# Patient Record
Sex: FEMALE | Race: WHITE | NOT HISPANIC OR LATINO | URBAN - METROPOLITAN AREA
[De-identification: names, ages, dates, MRNs, and addresses within clinical notes are randomized per-mention and may not be internally consistent; named-entity substitution may affect disease eponyms.]

---

## 2017-01-10 ENCOUNTER — COMMUNICATION - HEALTHEAST (OUTPATIENT)
Dept: FAMILY MEDICINE | Facility: CLINIC | Age: 82
End: 2017-01-10

## 2017-01-13 ENCOUNTER — COMMUNICATION - HEALTHEAST (OUTPATIENT)
Dept: FAMILY MEDICINE | Facility: CLINIC | Age: 82
End: 2017-01-13

## 2017-01-13 DIAGNOSIS — I48.0 PAROXYSMAL ATRIAL FIBRILLATION (H): ICD-10-CM

## 2017-02-09 ENCOUNTER — COMMUNICATION - HEALTHEAST (OUTPATIENT)
Dept: FAMILY MEDICINE | Facility: CLINIC | Age: 82
End: 2017-02-09

## 2017-02-22 ENCOUNTER — COMMUNICATION - HEALTHEAST (OUTPATIENT)
Dept: FAMILY MEDICINE | Facility: CLINIC | Age: 82
End: 2017-02-22

## 2017-02-22 DIAGNOSIS — K21.9 GERD (GASTROESOPHAGEAL REFLUX DISEASE): ICD-10-CM

## 2017-03-14 ENCOUNTER — COMMUNICATION - HEALTHEAST (OUTPATIENT)
Dept: FAMILY MEDICINE | Facility: CLINIC | Age: 82
End: 2017-03-14

## 2017-04-10 ENCOUNTER — COMMUNICATION - HEALTHEAST (OUTPATIENT)
Dept: FAMILY MEDICINE | Facility: CLINIC | Age: 82
End: 2017-04-10

## 2017-04-10 DIAGNOSIS — I48.0 PAROXYSMAL ATRIAL FIBRILLATION (H): ICD-10-CM

## 2017-04-14 ENCOUNTER — COMMUNICATION - HEALTHEAST (OUTPATIENT)
Dept: FAMILY MEDICINE | Facility: CLINIC | Age: 82
End: 2017-04-14

## 2017-04-14 DIAGNOSIS — F41.9 ANXIETY: ICD-10-CM

## 2017-05-08 ENCOUNTER — COMMUNICATION - HEALTHEAST (OUTPATIENT)
Dept: FAMILY MEDICINE | Facility: CLINIC | Age: 82
End: 2017-05-08

## 2017-05-11 ENCOUNTER — COMMUNICATION - HEALTHEAST (OUTPATIENT)
Dept: FAMILY MEDICINE | Facility: CLINIC | Age: 82
End: 2017-05-11

## 2017-05-23 ENCOUNTER — COMMUNICATION - HEALTHEAST (OUTPATIENT)
Dept: FAMILY MEDICINE | Facility: CLINIC | Age: 82
End: 2017-05-23

## 2017-05-23 DIAGNOSIS — R52 PAIN: ICD-10-CM

## 2017-06-23 ENCOUNTER — COMMUNICATION - HEALTHEAST (OUTPATIENT)
Dept: FAMILY MEDICINE | Facility: CLINIC | Age: 82
End: 2017-06-23

## 2017-06-23 DIAGNOSIS — R52 PAIN: ICD-10-CM

## 2017-07-04 ENCOUNTER — COMMUNICATION - HEALTHEAST (OUTPATIENT)
Dept: FAMILY MEDICINE | Facility: CLINIC | Age: 82
End: 2017-07-04

## 2017-07-04 DIAGNOSIS — I25.10 CAD (CORONARY ARTERY DISEASE): ICD-10-CM

## 2017-07-04 DIAGNOSIS — E78.5 HYPERLIPIDEMIA: ICD-10-CM

## 2017-07-05 ENCOUNTER — COMMUNICATION - HEALTHEAST (OUTPATIENT)
Dept: FAMILY MEDICINE | Facility: CLINIC | Age: 82
End: 2017-07-05

## 2017-07-10 ENCOUNTER — COMMUNICATION - HEALTHEAST (OUTPATIENT)
Dept: FAMILY MEDICINE | Facility: CLINIC | Age: 82
End: 2017-07-10

## 2017-07-10 DIAGNOSIS — I48.0 PAROXYSMAL ATRIAL FIBRILLATION (H): ICD-10-CM

## 2017-07-10 DIAGNOSIS — E78.5 DYSLIPIDEMIA: ICD-10-CM

## 2017-07-10 DIAGNOSIS — I25.10 CORONARY ATHEROSCLEROSIS: ICD-10-CM

## 2017-07-17 ENCOUNTER — COMMUNICATION - HEALTHEAST (OUTPATIENT)
Dept: FAMILY MEDICINE | Facility: CLINIC | Age: 82
End: 2017-07-17

## 2017-07-17 DIAGNOSIS — R52 PAIN: ICD-10-CM

## 2017-07-24 ENCOUNTER — OFFICE VISIT - HEALTHEAST (OUTPATIENT)
Dept: CARDIOLOGY | Facility: CLINIC | Age: 82
End: 2017-07-24

## 2017-07-24 DIAGNOSIS — E78.5 DYSLIPIDEMIA: ICD-10-CM

## 2017-07-24 DIAGNOSIS — I10 ESSENTIAL HYPERTENSION: ICD-10-CM

## 2017-07-24 DIAGNOSIS — I25.10 CORONARY ARTERY DISEASE INVOLVING NATIVE CORONARY ARTERY OF NATIVE HEART WITHOUT ANGINA PECTORIS: ICD-10-CM

## 2017-07-24 DIAGNOSIS — I48.0 PAROXYSMAL ATRIAL FIBRILLATION (H): ICD-10-CM

## 2017-07-24 LAB
ATRIAL RATE - MUSE: 54 BPM
DIASTOLIC BLOOD PRESSURE - MUSE: NORMAL MMHG
INTERPRETATION ECG - MUSE: NORMAL
P AXIS - MUSE: NORMAL DEGREES
PR INTERVAL - MUSE: 192 MS
QRS DURATION - MUSE: 80 MS
QT - MUSE: 434 MS
QTC - MUSE: 440 MS
R AXIS - MUSE: -15 DEGREES
SYSTOLIC BLOOD PRESSURE - MUSE: NORMAL MMHG
T AXIS - MUSE: 47 DEGREES
VENTRICULAR RATE- MUSE: 62 BPM

## 2017-07-24 ASSESSMENT — MIFFLIN-ST. JEOR: SCORE: 942.99

## 2017-07-27 ENCOUNTER — OFFICE VISIT - HEALTHEAST (OUTPATIENT)
Dept: FAMILY MEDICINE | Facility: CLINIC | Age: 82
End: 2017-07-27

## 2017-07-27 DIAGNOSIS — I10 ESSENTIAL HYPERTENSION: ICD-10-CM

## 2017-07-27 DIAGNOSIS — E78.5 HYPERLIPIDEMIA: ICD-10-CM

## 2017-07-27 DIAGNOSIS — I25.10 CORONARY ATHEROSCLEROSIS: ICD-10-CM

## 2017-07-27 DIAGNOSIS — R52 PAIN: ICD-10-CM

## 2017-07-27 DIAGNOSIS — M75.02 ADHESIVE CAPSULITIS OF LEFT SHOULDER: ICD-10-CM

## 2017-07-27 LAB
CHOLEST SERPL-MCNC: 155 MG/DL
FASTING STATUS PATIENT QL REPORTED: ABNORMAL
HDLC SERPL-MCNC: 36 MG/DL
LDLC SERPL CALC-MCNC: 80 MG/DL
TRIGL SERPL-MCNC: 195 MG/DL

## 2017-07-28 ENCOUNTER — COMMUNICATION - HEALTHEAST (OUTPATIENT)
Dept: FAMILY MEDICINE | Facility: CLINIC | Age: 82
End: 2017-07-28

## 2017-07-28 DIAGNOSIS — I25.10 CORONARY ATHEROSCLEROSIS: ICD-10-CM

## 2017-08-02 ENCOUNTER — OFFICE VISIT - HEALTHEAST (OUTPATIENT)
Dept: PHYSICAL THERAPY | Facility: REHABILITATION | Age: 82
End: 2017-08-02

## 2017-08-02 DIAGNOSIS — M25.512 PAIN IN JOINT OF LEFT SHOULDER: ICD-10-CM

## 2017-08-02 DIAGNOSIS — M25.532 PAIN IN JOINT, FOREARM, LEFT: ICD-10-CM

## 2017-08-02 DIAGNOSIS — M75.02 ADHESIVE CAPSULITIS OF LEFT SHOULDER: ICD-10-CM

## 2017-08-02 DIAGNOSIS — M25.542 ARTHRALGIA OF LEFT HAND: ICD-10-CM

## 2017-08-02 DIAGNOSIS — M25.522 PAIN IN JOINT, UPPER ARM, LEFT: ICD-10-CM

## 2017-08-09 ENCOUNTER — COMMUNICATION - HEALTHEAST (OUTPATIENT)
Dept: FAMILY MEDICINE | Facility: CLINIC | Age: 82
End: 2017-08-09

## 2017-08-09 DIAGNOSIS — I25.10 CORONARY ATHEROSCLEROSIS: ICD-10-CM

## 2017-08-15 ENCOUNTER — OFFICE VISIT - HEALTHEAST (OUTPATIENT)
Dept: PHYSICAL THERAPY | Facility: REHABILITATION | Age: 82
End: 2017-08-15

## 2017-08-15 DIAGNOSIS — M25.512 PAIN IN JOINT OF LEFT SHOULDER: ICD-10-CM

## 2017-08-15 DIAGNOSIS — M25.522 PAIN IN JOINT, UPPER ARM, LEFT: ICD-10-CM

## 2017-08-15 DIAGNOSIS — M25.532 PAIN IN JOINT, FOREARM, LEFT: ICD-10-CM

## 2017-08-15 DIAGNOSIS — M62.81 MUSCLE WEAKNESS (GENERALIZED): ICD-10-CM

## 2017-08-15 DIAGNOSIS — M25.60 LIMITED JOINT RANGE OF MOTION (ROM): ICD-10-CM

## 2017-08-15 DIAGNOSIS — M75.02 ADHESIVE CAPSULITIS OF LEFT SHOULDER: ICD-10-CM

## 2017-08-15 DIAGNOSIS — M25.542 ARTHRALGIA OF LEFT HAND: ICD-10-CM

## 2017-08-23 ENCOUNTER — OFFICE VISIT - HEALTHEAST (OUTPATIENT)
Dept: PHYSICAL THERAPY | Facility: REHABILITATION | Age: 82
End: 2017-08-23

## 2017-08-23 DIAGNOSIS — M25.512 PAIN IN JOINT OF LEFT SHOULDER: ICD-10-CM

## 2017-08-23 DIAGNOSIS — M25.522 PAIN IN JOINT, UPPER ARM, LEFT: ICD-10-CM

## 2017-08-23 DIAGNOSIS — M25.542 ARTHRALGIA OF LEFT HAND: ICD-10-CM

## 2017-08-23 DIAGNOSIS — M75.02 ADHESIVE CAPSULITIS OF LEFT SHOULDER: ICD-10-CM

## 2017-08-23 DIAGNOSIS — M25.532 PAIN IN JOINT, FOREARM, LEFT: ICD-10-CM

## 2017-08-25 ENCOUNTER — OFFICE VISIT - HEALTHEAST (OUTPATIENT)
Dept: PHYSICAL THERAPY | Facility: REHABILITATION | Age: 82
End: 2017-08-25

## 2017-08-25 DIAGNOSIS — M25.532 PAIN IN JOINT, FOREARM, LEFT: ICD-10-CM

## 2017-08-25 DIAGNOSIS — M25.522 PAIN IN JOINT, UPPER ARM, LEFT: ICD-10-CM

## 2017-08-25 DIAGNOSIS — M75.02 ADHESIVE CAPSULITIS OF LEFT SHOULDER: ICD-10-CM

## 2017-08-25 DIAGNOSIS — M25.512 PAIN IN JOINT OF LEFT SHOULDER: ICD-10-CM

## 2017-08-25 DIAGNOSIS — M25.542 ARTHRALGIA OF LEFT HAND: ICD-10-CM

## 2017-08-27 ENCOUNTER — COMMUNICATION - HEALTHEAST (OUTPATIENT)
Dept: FAMILY MEDICINE | Facility: CLINIC | Age: 82
End: 2017-08-27

## 2017-08-27 DIAGNOSIS — I25.10 CAD (CORONARY ARTERY DISEASE): ICD-10-CM

## 2017-08-28 ENCOUNTER — OFFICE VISIT - HEALTHEAST (OUTPATIENT)
Dept: PHYSICAL THERAPY | Facility: REHABILITATION | Age: 82
End: 2017-08-28

## 2017-08-28 DIAGNOSIS — M25.542 ARTHRALGIA OF LEFT HAND: ICD-10-CM

## 2017-08-28 DIAGNOSIS — M25.512 PAIN IN JOINT OF LEFT SHOULDER: ICD-10-CM

## 2017-08-28 DIAGNOSIS — M75.02 ADHESIVE CAPSULITIS OF LEFT SHOULDER: ICD-10-CM

## 2017-08-28 DIAGNOSIS — M25.532 PAIN IN JOINT, FOREARM, LEFT: ICD-10-CM

## 2017-08-28 DIAGNOSIS — M25.522 PAIN IN JOINT, UPPER ARM, LEFT: ICD-10-CM

## 2017-08-30 ENCOUNTER — OFFICE VISIT - HEALTHEAST (OUTPATIENT)
Dept: PHYSICAL THERAPY | Facility: REHABILITATION | Age: 82
End: 2017-08-30

## 2017-08-30 DIAGNOSIS — M25.512 PAIN IN JOINT OF LEFT SHOULDER: ICD-10-CM

## 2017-08-30 DIAGNOSIS — M75.02 ADHESIVE CAPSULITIS OF LEFT SHOULDER: ICD-10-CM

## 2017-08-30 DIAGNOSIS — M25.522 PAIN IN JOINT, UPPER ARM, LEFT: ICD-10-CM

## 2017-08-30 DIAGNOSIS — M25.542 ARTHRALGIA OF LEFT HAND: ICD-10-CM

## 2017-08-30 DIAGNOSIS — M25.532 PAIN IN JOINT, FOREARM, LEFT: ICD-10-CM

## 2017-09-26 ENCOUNTER — COMMUNICATION - HEALTHEAST (OUTPATIENT)
Dept: FAMILY MEDICINE | Facility: CLINIC | Age: 82
End: 2017-09-26

## 2017-09-26 DIAGNOSIS — M75.02 ADHESIVE CAPSULITIS OF LEFT SHOULDER: ICD-10-CM

## 2017-10-14 ENCOUNTER — COMMUNICATION - HEALTHEAST (OUTPATIENT)
Dept: FAMILY MEDICINE | Facility: CLINIC | Age: 82
End: 2017-10-14

## 2017-11-04 ENCOUNTER — COMMUNICATION - HEALTHEAST (OUTPATIENT)
Dept: FAMILY MEDICINE | Facility: CLINIC | Age: 82
End: 2017-11-04

## 2017-11-15 ENCOUNTER — COMMUNICATION - HEALTHEAST (OUTPATIENT)
Dept: FAMILY MEDICINE | Facility: CLINIC | Age: 82
End: 2017-11-15

## 2017-11-15 DIAGNOSIS — M75.02 ADHESIVE CAPSULITIS OF LEFT SHOULDER: ICD-10-CM

## 2017-11-20 ENCOUNTER — OFFICE VISIT - HEALTHEAST (OUTPATIENT)
Dept: FAMILY MEDICINE | Facility: CLINIC | Age: 82
End: 2017-11-20

## 2017-11-20 DIAGNOSIS — Z13.820 SCREENING FOR OSTEOPOROSIS: ICD-10-CM

## 2017-11-20 DIAGNOSIS — F33.0 MILD EPISODE OF RECURRENT MAJOR DEPRESSIVE DISORDER (H): ICD-10-CM

## 2017-11-20 DIAGNOSIS — R06.02 SOB (SHORTNESS OF BREATH): ICD-10-CM

## 2017-11-20 DIAGNOSIS — I25.10 CORONARY ATHEROSCLEROSIS: ICD-10-CM

## 2017-11-20 DIAGNOSIS — M75.02 ADHESIVE CAPSULITIS OF LEFT SHOULDER: ICD-10-CM

## 2017-11-20 DIAGNOSIS — R07.9 CHEST PAIN, UNSPECIFIED TYPE: ICD-10-CM

## 2017-11-20 DIAGNOSIS — I25.10 CAD (CORONARY ARTERY DISEASE): ICD-10-CM

## 2017-11-24 ENCOUNTER — COMMUNICATION - HEALTHEAST (OUTPATIENT)
Dept: FAMILY MEDICINE | Facility: CLINIC | Age: 82
End: 2017-11-24

## 2017-11-24 DIAGNOSIS — K21.9 GERD (GASTROESOPHAGEAL REFLUX DISEASE): ICD-10-CM

## 2017-11-28 ENCOUNTER — HOSPITAL ENCOUNTER (OUTPATIENT)
Dept: CARDIOLOGY | Facility: CLINIC | Age: 82
Discharge: HOME | End: 2017-11-28
Attending: FAMILY MEDICINE

## 2017-11-28 DIAGNOSIS — I25.10 CAD (CORONARY ARTERY DISEASE): ICD-10-CM

## 2017-11-28 DIAGNOSIS — R07.9 CHEST PAIN, UNSPECIFIED TYPE: ICD-10-CM

## 2017-11-28 DIAGNOSIS — R06.02 SOB (SHORTNESS OF BREATH): ICD-10-CM

## 2017-11-28 DIAGNOSIS — I25.10 CORONARY ATHEROSCLEROSIS: ICD-10-CM

## 2017-11-28 LAB
AORTIC ROOT: 3 CM
BSA FOR ECHO PROCEDURE: 1.59 M2
CV BLOOD PRESSURE: NORMAL MMHG
CV ECHO HEIGHT: 59 IN
CV ECHO WEIGHT: 135 LBS
DOP CALC LVOT AREA: 2.54 CM2
DOP CALC LVOT DIAMETER: 1.8 CM
DOP CALC LVOT PEAK VEL: 84.6 CM/S
DOP CALC LVOT STROKE VOLUME: 36.6 CM3
DOP CALC MV VTI: 27.4 CM
DOP CALCLVOT PEAK VEL VTI: 14.4 CM
ECHO EJECTION FRACTION ESTIMATED: 55 %
EJECTION FRACTION: 49 % (ref 55–75)
FRACTIONAL SHORTENING: 25.1 % (ref 28–44)
INTERVENTRICULAR SEPTUM IN END DIASTOLE: 1.25 CM (ref 0.6–0.9)
IVS/PW RATIO: 1
LA AREA 2: 13.8 CM2
LEFT ATRIUM LENGTH: 5.28 CM
LEFT ATRIUM SIZE: 3.8 CM
LEFT VENTRICLE CARDIAC INDEX: 1.2 L/MIN/M2
LEFT VENTRICLE CARDIAC OUTPUT: 1.9 L/MIN
LEFT VENTRICLE DIASTOLIC VOLUME INDEX: 24.5 CM3/M2 (ref 34–74)
LEFT VENTRICLE DIASTOLIC VOLUME: 39 CM3 (ref 46–106)
LEFT VENTRICLE HEART RATE: 53 BPM
LEFT VENTRICLE MASS INDEX: 98.3 G/M2
LEFT VENTRICLE SYSTOLIC VOLUME INDEX: 12.6 CM3/M2 (ref 11–31)
LEFT VENTRICLE SYSTOLIC VOLUME: 20 CM3 (ref 14–42)
LEFT VENTRICULAR INTERNAL DIMENSION IN DIASTOLE: 3.63 CM (ref 3.8–5.2)
LEFT VENTRICULAR INTERNAL DIMENSION IN SYSTOLE: 2.72 CM (ref 2.2–3.5)
LEFT VENTRICULAR MASS: 156.2 G
LEFT VENTRICULAR OUTFLOW TRACT MEAN GRADIENT: 1 MMHG
LEFT VENTRICULAR OUTFLOW TRACT MEAN VELOCITY: 53 CM/S
LEFT VENTRICULAR OUTFLOW TRACT PEAK GRADIENT: 3 MMHG
LEFT VENTRICULAR POSTERIOR WALL IN END DIASTOLE: 1.29 CM (ref 0.6–0.9)
LV STROKE VOLUME INDEX: 23 ML/M2
MITRAL VALVE DECELERATION SLOPE: 1120 MM/S2
MITRAL VALVE E/A RATIO: 0.6
MITRAL VALVE MEAN INFLOW VELOCITY: 43.6 CM/S
MITRAL VALVE PEAK VELOCITY: 85.7 CM/S
MITRAL VALVE PRESSURE HALF-TIME: 147 MS
MV AREA VTI: 1.34 CM2
MV AVERAGE E/E' RATIO: 8.1 CM/S
MV DECELERATION TIME: 254 MS
MV E'TISSUE VEL-LAT: 5.85 CM/S
MV E'TISSUE VEL-MED: 3.31 CM/S
MV LATERAL E/E' RATIO: 6.3
MV MEAN GRADIENT: 1 MMHG
MV MEDIAL E/E' RATIO: 11.2
MV PEAK A VELOCITY: 61.1 CM/S
MV PEAK E VELOCITY: 37 CM/S
MV PEAK GRADIENT: 2.9 MMHG
MV VALVE AREA BY CONTINUITY EQUATION: 1.3 CM2
MV VALVE AREA PRESSURE 1/2 METHOD: 1.5 CM2
NUC REST DIASTOLIC VOLUME INDEX: 2160 LBS
NUC REST SYSTOLIC VOLUME INDEX: 59 IN
PR MAX PG: 5 MMHG
PR PEAK VELOCITY: 108 CM/S
TRICUSPID REGURGITATION PEAK PRESSURE GRADIENT: 18.7 MMHG
TRICUSPID VALVE PEAK REGURGITANT VELOCITY: 216 CM/S

## 2017-11-28 ASSESSMENT — MIFFLIN-ST. JEOR: SCORE: 942.99

## 2017-12-05 ENCOUNTER — COMMUNICATION - HEALTHEAST (OUTPATIENT)
Dept: FAMILY MEDICINE | Facility: CLINIC | Age: 82
End: 2017-12-05

## 2017-12-05 DIAGNOSIS — K21.9 GERD (GASTROESOPHAGEAL REFLUX DISEASE): ICD-10-CM

## 2018-01-31 ENCOUNTER — COMMUNICATION - HEALTHEAST (OUTPATIENT)
Dept: FAMILY MEDICINE | Facility: CLINIC | Age: 83
End: 2018-01-31

## 2018-01-31 DIAGNOSIS — M75.02 ADHESIVE CAPSULITIS OF LEFT SHOULDER: ICD-10-CM

## 2018-02-14 ENCOUNTER — COMMUNICATION - HEALTHEAST (OUTPATIENT)
Dept: FAMILY MEDICINE | Facility: CLINIC | Age: 83
End: 2018-02-14

## 2018-02-15 ENCOUNTER — OFFICE VISIT - HEALTHEAST (OUTPATIENT)
Dept: FAMILY MEDICINE | Facility: CLINIC | Age: 83
End: 2018-02-15

## 2018-02-15 DIAGNOSIS — I10 ESSENTIAL HYPERTENSION: ICD-10-CM

## 2018-02-15 DIAGNOSIS — F51.01 PRIMARY INSOMNIA: ICD-10-CM

## 2018-02-22 ENCOUNTER — OFFICE VISIT - HEALTHEAST (OUTPATIENT)
Dept: FAMILY MEDICINE | Facility: CLINIC | Age: 83
End: 2018-02-22

## 2018-02-22 DIAGNOSIS — Z13.820 SCREENING FOR OSTEOPOROSIS: ICD-10-CM

## 2018-02-22 DIAGNOSIS — F51.01 PRIMARY INSOMNIA: ICD-10-CM

## 2018-02-22 DIAGNOSIS — F33.0 MILD EPISODE OF RECURRENT MAJOR DEPRESSIVE DISORDER (H): ICD-10-CM

## 2018-02-22 DIAGNOSIS — R41.3 MEMORY DEFICIT: ICD-10-CM

## 2018-02-22 DIAGNOSIS — I10 ESSENTIAL HYPERTENSION: ICD-10-CM

## 2018-03-07 ENCOUNTER — RECORDS - HEALTHEAST (OUTPATIENT)
Dept: ADMINISTRATIVE | Facility: OTHER | Age: 83
End: 2018-03-07

## 2018-03-07 ENCOUNTER — COMMUNICATION - HEALTHEAST (OUTPATIENT)
Dept: FAMILY MEDICINE | Facility: CLINIC | Age: 83
End: 2018-03-07

## 2018-03-07 ENCOUNTER — RECORDS - HEALTHEAST (OUTPATIENT)
Dept: BONE DENSITY | Facility: CLINIC | Age: 83
End: 2018-03-07

## 2018-03-07 DIAGNOSIS — Z78.0 ASYMPTOMATIC MENOPAUSAL STATE: ICD-10-CM

## 2018-03-07 DIAGNOSIS — Z13.820 ENCOUNTER FOR SCREENING FOR OSTEOPOROSIS: ICD-10-CM

## 2018-03-12 ENCOUNTER — OFFICE VISIT - HEALTHEAST (OUTPATIENT)
Dept: FAMILY MEDICINE | Facility: CLINIC | Age: 83
End: 2018-03-12

## 2018-03-12 DIAGNOSIS — F51.01 PRIMARY INSOMNIA: ICD-10-CM

## 2018-03-12 DIAGNOSIS — M81.0 AGE-RELATED OSTEOPOROSIS WITHOUT CURRENT PATHOLOGICAL FRACTURE: ICD-10-CM

## 2018-03-12 DIAGNOSIS — I10 ESSENTIAL HYPERTENSION: ICD-10-CM

## 2018-03-13 ENCOUNTER — COMMUNICATION - HEALTHEAST (OUTPATIENT)
Dept: FAMILY MEDICINE | Facility: CLINIC | Age: 83
End: 2018-03-13

## 2018-03-14 ENCOUNTER — AMBULATORY - HEALTHEAST (OUTPATIENT)
Dept: NURSING | Facility: CLINIC | Age: 83
End: 2018-03-14

## 2018-03-14 DIAGNOSIS — I10 ESSENTIAL HYPERTENSION: ICD-10-CM

## 2018-03-16 ENCOUNTER — OFFICE VISIT - HEALTHEAST (OUTPATIENT)
Dept: FAMILY MEDICINE | Facility: CLINIC | Age: 83
End: 2018-03-16

## 2018-03-16 DIAGNOSIS — R41.0 DELIRIUM: ICD-10-CM

## 2018-03-16 DIAGNOSIS — F03.90 DEMENTIA (H): ICD-10-CM

## 2018-03-20 ENCOUNTER — COMMUNICATION - HEALTHEAST (OUTPATIENT)
Dept: FAMILY MEDICINE | Facility: CLINIC | Age: 83
End: 2018-03-20

## 2018-03-20 DIAGNOSIS — F41.9 ANXIETY: ICD-10-CM

## 2018-03-21 ENCOUNTER — COMMUNICATION - HEALTHEAST (OUTPATIENT)
Dept: FAMILY MEDICINE | Facility: CLINIC | Age: 83
End: 2018-03-21

## 2018-03-21 ENCOUNTER — AMBULATORY - HEALTHEAST (OUTPATIENT)
Dept: FAMILY MEDICINE | Facility: CLINIC | Age: 83
End: 2018-03-21

## 2018-03-23 ENCOUNTER — OFFICE VISIT - HEALTHEAST (OUTPATIENT)
Dept: FAMILY MEDICINE | Facility: CLINIC | Age: 83
End: 2018-03-23

## 2018-03-23 DIAGNOSIS — I10 ESSENTIAL HYPERTENSION: ICD-10-CM

## 2018-03-23 DIAGNOSIS — F51.01 PRIMARY INSOMNIA: ICD-10-CM

## 2018-03-23 DIAGNOSIS — G40.209 PARTIAL SYMPTOMATIC EPILEPSY WITH COMPLEX PARTIAL SEIZURES, NOT INTRACTABLE, WITHOUT STATUS EPILEPTICUS (H): ICD-10-CM

## 2018-03-23 DIAGNOSIS — R41.3 MEMORY DEFICIT: ICD-10-CM

## 2018-03-23 DIAGNOSIS — I63.9 SUBCORTICAL INFARCTION (H): ICD-10-CM

## 2018-04-05 ENCOUNTER — AMBULATORY - HEALTHEAST (OUTPATIENT)
Dept: BEHAVIORAL HEALTH | Facility: CLINIC | Age: 83
End: 2018-04-05

## 2018-04-05 DIAGNOSIS — I63.9 SUBCORTICAL INFARCTION (H): ICD-10-CM

## 2018-04-05 DIAGNOSIS — F01.A0 MAJOR NEUROCOGNITIVE DISORDER, DUE TO VASCULAR DISEASE, WITHOUT BEHAVIORAL DISTURBANCE, MILD (H): ICD-10-CM

## 2018-04-11 ENCOUNTER — COMMUNICATION - HEALTHEAST (OUTPATIENT)
Dept: FAMILY MEDICINE | Facility: CLINIC | Age: 83
End: 2018-04-11

## 2018-04-11 DIAGNOSIS — I48.0 PAROXYSMAL ATRIAL FIBRILLATION (H): ICD-10-CM

## 2018-07-17 ENCOUNTER — COMMUNICATION - HEALTHEAST (OUTPATIENT)
Dept: FAMILY MEDICINE | Facility: CLINIC | Age: 83
End: 2018-07-17

## 2018-07-17 DIAGNOSIS — G40.209 PARTIAL SYMPTOMATIC EPILEPSY WITH COMPLEX PARTIAL SEIZURES, NOT INTRACTABLE, WITHOUT STATUS EPILEPTICUS (H): ICD-10-CM

## 2018-08-28 ENCOUNTER — COMMUNICATION - HEALTHEAST (OUTPATIENT)
Dept: ADMINISTRATIVE | Facility: CLINIC | Age: 83
End: 2018-08-28

## 2018-08-28 ENCOUNTER — COMMUNICATION - HEALTHEAST (OUTPATIENT)
Dept: FAMILY MEDICINE | Facility: CLINIC | Age: 83
End: 2018-08-28

## 2018-08-28 DIAGNOSIS — I25.10 CORONARY ATHEROSCLEROSIS: ICD-10-CM

## 2018-09-14 ENCOUNTER — COMMUNICATION - HEALTHEAST (OUTPATIENT)
Dept: FAMILY MEDICINE | Facility: CLINIC | Age: 83
End: 2018-09-14

## 2018-09-18 ENCOUNTER — COMMUNICATION - HEALTHEAST (OUTPATIENT)
Dept: CARE COORDINATION | Facility: CLINIC | Age: 83
End: 2018-09-18

## 2018-10-04 ENCOUNTER — COMMUNICATION - HEALTHEAST (OUTPATIENT)
Dept: FAMILY MEDICINE | Facility: CLINIC | Age: 83
End: 2018-10-04

## 2018-10-04 DIAGNOSIS — E78.5 HYPERLIPIDEMIA: ICD-10-CM

## 2018-11-02 ENCOUNTER — OFFICE VISIT - HEALTHEAST (OUTPATIENT)
Dept: CARDIOLOGY | Facility: CLINIC | Age: 83
End: 2018-11-02

## 2018-11-02 DIAGNOSIS — I25.10 CORONARY ARTERY DISEASE INVOLVING NATIVE CORONARY ARTERY OF NATIVE HEART WITHOUT ANGINA PECTORIS: ICD-10-CM

## 2018-11-02 DIAGNOSIS — I10 ESSENTIAL HYPERTENSION: ICD-10-CM

## 2018-11-02 DIAGNOSIS — E78.5 DYSLIPIDEMIA: ICD-10-CM

## 2018-11-02 DIAGNOSIS — I48.0 PAROXYSMAL ATRIAL FIBRILLATION (H): ICD-10-CM

## 2018-11-02 ASSESSMENT — MIFFLIN-ST. JEOR: SCORE: 974.74

## 2018-11-16 ENCOUNTER — COMMUNICATION - HEALTHEAST (OUTPATIENT)
Dept: FAMILY MEDICINE | Facility: CLINIC | Age: 83
End: 2018-11-16

## 2018-11-16 DIAGNOSIS — M75.02 ADHESIVE CAPSULITIS OF LEFT SHOULDER: ICD-10-CM

## 2018-11-20 ENCOUNTER — COMMUNICATION - HEALTHEAST (OUTPATIENT)
Dept: FAMILY MEDICINE | Facility: CLINIC | Age: 83
End: 2018-11-20

## 2018-11-21 ENCOUNTER — COMMUNICATION - HEALTHEAST (OUTPATIENT)
Dept: FAMILY MEDICINE | Facility: CLINIC | Age: 83
End: 2018-11-21

## 2018-11-21 DIAGNOSIS — M75.02 ADHESIVE CAPSULITIS OF LEFT SHOULDER: ICD-10-CM

## 2018-11-22 ENCOUNTER — COMMUNICATION - HEALTHEAST (OUTPATIENT)
Dept: FAMILY MEDICINE | Facility: CLINIC | Age: 83
End: 2018-11-22

## 2018-11-22 DIAGNOSIS — I25.10 CAD (CORONARY ARTERY DISEASE): ICD-10-CM

## 2018-12-04 ENCOUNTER — COMMUNICATION - HEALTHEAST (OUTPATIENT)
Dept: FAMILY MEDICINE | Facility: CLINIC | Age: 83
End: 2018-12-04

## 2018-12-04 DIAGNOSIS — G40.209 PARTIAL SYMPTOMATIC EPILEPSY WITH COMPLEX PARTIAL SEIZURES, NOT INTRACTABLE, WITHOUT STATUS EPILEPTICUS (H): ICD-10-CM

## 2018-12-31 ENCOUNTER — COMMUNICATION - HEALTHEAST (OUTPATIENT)
Dept: FAMILY MEDICINE | Facility: CLINIC | Age: 83
End: 2018-12-31

## 2018-12-31 DIAGNOSIS — I63.9 SUBCORTICAL INFARCTION (H): ICD-10-CM

## 2019-01-08 ENCOUNTER — COMMUNICATION - HEALTHEAST (OUTPATIENT)
Dept: FAMILY MEDICINE | Facility: CLINIC | Age: 84
End: 2019-01-08

## 2019-01-08 DIAGNOSIS — M75.02 ADHESIVE CAPSULITIS OF LEFT SHOULDER: ICD-10-CM

## 2019-01-15 ENCOUNTER — OFFICE VISIT - HEALTHEAST (OUTPATIENT)
Dept: FAMILY MEDICINE | Facility: CLINIC | Age: 84
End: 2019-01-15

## 2019-01-15 DIAGNOSIS — I25.10 CORONARY ARTERY DISEASE INVOLVING NATIVE CORONARY ARTERY OF NATIVE HEART WITHOUT ANGINA PECTORIS: ICD-10-CM

## 2019-01-15 DIAGNOSIS — I63.9 SUBCORTICAL INFARCTION (H): ICD-10-CM

## 2019-01-15 DIAGNOSIS — I48.0 PAROXYSMAL ATRIAL FIBRILLATION (H): ICD-10-CM

## 2019-01-15 DIAGNOSIS — F33.0 MILD EPISODE OF RECURRENT MAJOR DEPRESSIVE DISORDER (H): ICD-10-CM

## 2019-01-15 ASSESSMENT — MIFFLIN-ST. JEOR: SCORE: 965.67

## 2019-01-24 ENCOUNTER — AMBULATORY - HEALTHEAST (OUTPATIENT)
Dept: FAMILY MEDICINE | Facility: CLINIC | Age: 84
End: 2019-01-24

## 2019-01-25 ENCOUNTER — COMMUNICATION - HEALTHEAST (OUTPATIENT)
Dept: FAMILY MEDICINE | Facility: CLINIC | Age: 84
End: 2019-01-25

## 2019-02-07 ENCOUNTER — COMMUNICATION - HEALTHEAST (OUTPATIENT)
Dept: FAMILY MEDICINE | Facility: CLINIC | Age: 84
End: 2019-02-07

## 2019-02-07 DIAGNOSIS — M75.02 ADHESIVE CAPSULITIS OF LEFT SHOULDER: ICD-10-CM

## 2019-02-09 ENCOUNTER — COMMUNICATION - HEALTHEAST (OUTPATIENT)
Dept: FAMILY MEDICINE | Facility: CLINIC | Age: 84
End: 2019-02-09

## 2019-02-09 DIAGNOSIS — F33.0 MILD EPISODE OF RECURRENT MAJOR DEPRESSIVE DISORDER (H): ICD-10-CM

## 2019-02-18 ENCOUNTER — COMMUNICATION - HEALTHEAST (OUTPATIENT)
Dept: FAMILY MEDICINE | Facility: CLINIC | Age: 84
End: 2019-02-18

## 2019-02-18 DIAGNOSIS — K21.9 GERD (GASTROESOPHAGEAL REFLUX DISEASE): ICD-10-CM

## 2019-03-04 ENCOUNTER — COMMUNICATION - HEALTHEAST (OUTPATIENT)
Dept: FAMILY MEDICINE | Facility: CLINIC | Age: 84
End: 2019-03-04

## 2019-03-04 DIAGNOSIS — G40.209 PARTIAL SYMPTOMATIC EPILEPSY WITH COMPLEX PARTIAL SEIZURES, NOT INTRACTABLE, WITHOUT STATUS EPILEPTICUS (H): ICD-10-CM

## 2019-03-26 ENCOUNTER — COMMUNICATION - HEALTHEAST (OUTPATIENT)
Dept: PHARMACY | Facility: CLINIC | Age: 84
End: 2019-03-26

## 2019-03-27 ENCOUNTER — COMMUNICATION - HEALTHEAST (OUTPATIENT)
Dept: FAMILY MEDICINE | Facility: CLINIC | Age: 84
End: 2019-03-27

## 2019-04-02 ENCOUNTER — COMMUNICATION - HEALTHEAST (OUTPATIENT)
Dept: FAMILY MEDICINE | Facility: CLINIC | Age: 84
End: 2019-04-02

## 2019-04-02 DIAGNOSIS — I48.0 PAROXYSMAL ATRIAL FIBRILLATION (H): ICD-10-CM

## 2019-04-16 ENCOUNTER — COMMUNICATION - HEALTHEAST (OUTPATIENT)
Dept: FAMILY MEDICINE | Facility: CLINIC | Age: 84
End: 2019-04-16

## 2019-04-16 DIAGNOSIS — E78.5 HYPERLIPIDEMIA: ICD-10-CM

## 2019-04-17 ENCOUNTER — COMMUNICATION - HEALTHEAST (OUTPATIENT)
Dept: FAMILY MEDICINE | Facility: CLINIC | Age: 84
End: 2019-04-17

## 2019-05-02 ENCOUNTER — AMBULATORY - HEALTHEAST (OUTPATIENT)
Dept: PHARMACY | Facility: CLINIC | Age: 84
End: 2019-05-02

## 2019-05-02 DIAGNOSIS — F33.0 MILD EPISODE OF RECURRENT MAJOR DEPRESSIVE DISORDER (H): ICD-10-CM

## 2019-05-02 DIAGNOSIS — G40.209 PARTIAL SYMPTOMATIC EPILEPSY WITH COMPLEX PARTIAL SEIZURES, NOT INTRACTABLE, WITHOUT STATUS EPILEPTICUS (H): ICD-10-CM

## 2019-05-02 DIAGNOSIS — K21.9 GASTROESOPHAGEAL REFLUX DISEASE WITHOUT ESOPHAGITIS: ICD-10-CM

## 2019-05-02 DIAGNOSIS — M54.9 BACK PAIN, UNSPECIFIED BACK LOCATION, UNSPECIFIED BACK PAIN LATERALITY, UNSPECIFIED CHRONICITY: ICD-10-CM

## 2019-05-02 DIAGNOSIS — I48.20 CHRONIC ATRIAL FIBRILLATION (H): ICD-10-CM

## 2019-05-02 DIAGNOSIS — I25.119 ATHEROSCLEROSIS OF CORONARY ARTERY OF NATIVE HEART WITH ANGINA PECTORIS, UNSPECIFIED VESSEL OR LESION TYPE (H): ICD-10-CM

## 2019-05-02 DIAGNOSIS — I10 ESSENTIAL HYPERTENSION: ICD-10-CM

## 2019-05-02 DIAGNOSIS — R07.9 CHEST PAIN: ICD-10-CM

## 2019-05-14 ENCOUNTER — COMMUNICATION - HEALTHEAST (OUTPATIENT)
Dept: FAMILY MEDICINE | Facility: CLINIC | Age: 84
End: 2019-05-14

## 2019-05-14 DIAGNOSIS — M54.40 BILATERAL LOW BACK PAIN WITH SCIATICA, SCIATICA LATERALITY UNSPECIFIED, UNSPECIFIED CHRONICITY: ICD-10-CM

## 2019-05-27 ENCOUNTER — COMMUNICATION - HEALTHEAST (OUTPATIENT)
Dept: FAMILY MEDICINE | Facility: CLINIC | Age: 84
End: 2019-05-27

## 2019-05-27 DIAGNOSIS — F33.0 MILD EPISODE OF RECURRENT MAJOR DEPRESSIVE DISORDER (H): ICD-10-CM

## 2019-06-24 ENCOUNTER — COMMUNICATION - HEALTHEAST (OUTPATIENT)
Dept: FAMILY MEDICINE | Facility: CLINIC | Age: 84
End: 2019-06-24

## 2019-06-24 DIAGNOSIS — I63.9 SUBCORTICAL INFARCTION (H): ICD-10-CM

## 2019-06-26 ENCOUNTER — COMMUNICATION - HEALTHEAST (OUTPATIENT)
Dept: FAMILY MEDICINE | Facility: CLINIC | Age: 84
End: 2019-06-26

## 2019-06-26 DIAGNOSIS — M54.9 BACK PAIN, UNSPECIFIED BACK LOCATION, UNSPECIFIED BACK PAIN LATERALITY, UNSPECIFIED CHRONICITY: ICD-10-CM

## 2019-06-26 DIAGNOSIS — I63.9 SUBCORTICAL INFARCTION (H): ICD-10-CM

## 2019-07-25 ENCOUNTER — COMMUNICATION - HEALTHEAST (OUTPATIENT)
Dept: FAMILY MEDICINE | Facility: CLINIC | Age: 84
End: 2019-07-25

## 2019-07-25 DIAGNOSIS — F33.0 MILD EPISODE OF RECURRENT MAJOR DEPRESSIVE DISORDER (H): ICD-10-CM

## 2019-08-01 ENCOUNTER — COMMUNICATION - HEALTHEAST (OUTPATIENT)
Dept: FAMILY MEDICINE | Facility: CLINIC | Age: 84
End: 2019-08-01

## 2019-08-01 DIAGNOSIS — M54.9 BACK PAIN, UNSPECIFIED BACK LOCATION, UNSPECIFIED BACK PAIN LATERALITY, UNSPECIFIED CHRONICITY: ICD-10-CM

## 2019-08-04 ENCOUNTER — COMMUNICATION - HEALTHEAST (OUTPATIENT)
Dept: FAMILY MEDICINE | Facility: CLINIC | Age: 84
End: 2019-08-04

## 2019-08-04 DIAGNOSIS — I25.10 CORONARY ATHEROSCLEROSIS: ICD-10-CM

## 2019-08-15 ENCOUNTER — COMMUNICATION - HEALTHEAST (OUTPATIENT)
Dept: FAMILY MEDICINE | Facility: CLINIC | Age: 84
End: 2019-08-15

## 2019-08-15 DIAGNOSIS — K21.9 GERD (GASTROESOPHAGEAL REFLUX DISEASE): ICD-10-CM

## 2019-08-27 ENCOUNTER — COMMUNICATION - HEALTHEAST (OUTPATIENT)
Dept: ADMINISTRATIVE | Facility: CLINIC | Age: 84
End: 2019-08-27

## 2019-08-29 ENCOUNTER — COMMUNICATION - HEALTHEAST (OUTPATIENT)
Dept: CARDIOLOGY | Facility: CLINIC | Age: 84
End: 2019-08-29

## 2019-08-29 DIAGNOSIS — R07.9 CHEST PAIN: ICD-10-CM

## 2019-09-07 ENCOUNTER — COMMUNICATION - HEALTHEAST (OUTPATIENT)
Dept: FAMILY MEDICINE | Facility: CLINIC | Age: 84
End: 2019-09-07

## 2019-09-07 DIAGNOSIS — M54.9 BACK PAIN, UNSPECIFIED BACK LOCATION, UNSPECIFIED BACK PAIN LATERALITY, UNSPECIFIED CHRONICITY: ICD-10-CM

## 2019-09-18 ENCOUNTER — COMMUNICATION - HEALTHEAST (OUTPATIENT)
Dept: FAMILY MEDICINE | Facility: CLINIC | Age: 84
End: 2019-09-18

## 2019-09-18 DIAGNOSIS — I63.9 SUBCORTICAL INFARCTION (H): ICD-10-CM

## 2019-10-14 ENCOUNTER — COMMUNICATION - HEALTHEAST (OUTPATIENT)
Dept: FAMILY MEDICINE | Facility: CLINIC | Age: 84
End: 2019-10-14

## 2019-10-14 DIAGNOSIS — M54.9 BACK PAIN, UNSPECIFIED BACK LOCATION, UNSPECIFIED BACK PAIN LATERALITY, UNSPECIFIED CHRONICITY: ICD-10-CM

## 2019-10-18 ENCOUNTER — COMMUNICATION - HEALTHEAST (OUTPATIENT)
Dept: FAMILY MEDICINE | Facility: CLINIC | Age: 84
End: 2019-10-18

## 2019-10-21 ENCOUNTER — COMMUNICATION - HEALTHEAST (OUTPATIENT)
Dept: FAMILY MEDICINE | Facility: CLINIC | Age: 84
End: 2019-10-21

## 2019-10-24 ENCOUNTER — COMMUNICATION - HEALTHEAST (OUTPATIENT)
Dept: FAMILY MEDICINE | Facility: CLINIC | Age: 84
End: 2019-10-24

## 2019-10-24 ENCOUNTER — OFFICE VISIT - HEALTHEAST (OUTPATIENT)
Dept: FAMILY MEDICINE | Facility: CLINIC | Age: 84
End: 2019-10-24

## 2019-10-24 DIAGNOSIS — I48.20 CHRONIC ATRIAL FIBRILLATION (H): ICD-10-CM

## 2019-10-24 DIAGNOSIS — Z78.9 INPATIENT HOSPITALIZATION WITHIN LAST 30 DAYS: ICD-10-CM

## 2019-10-24 DIAGNOSIS — G45.9 TIA (TRANSIENT ISCHEMIC ATTACK): ICD-10-CM

## 2019-10-24 DIAGNOSIS — I10 ESSENTIAL HYPERTENSION: ICD-10-CM

## 2019-10-24 DIAGNOSIS — I25.10 CORONARY ARTERY DISEASE INVOLVING NATIVE CORONARY ARTERY OF NATIVE HEART WITHOUT ANGINA PECTORIS: ICD-10-CM

## 2019-10-24 DIAGNOSIS — I63.9 SUBCORTICAL INFARCTION (H): ICD-10-CM

## 2019-10-24 DIAGNOSIS — I25.119 ATHEROSCLEROSIS OF CORONARY ARTERY OF NATIVE HEART WITH ANGINA PECTORIS, UNSPECIFIED VESSEL OR LESION TYPE (H): ICD-10-CM

## 2019-10-24 ASSESSMENT — MIFFLIN-ST. JEOR: SCORE: 942.99

## 2019-10-25 ENCOUNTER — COMMUNICATION - HEALTHEAST (OUTPATIENT)
Dept: FAMILY MEDICINE | Facility: CLINIC | Age: 84
End: 2019-10-25

## 2019-10-25 DIAGNOSIS — M54.9 BACK PAIN, UNSPECIFIED BACK LOCATION, UNSPECIFIED BACK PAIN LATERALITY, UNSPECIFIED CHRONICITY: ICD-10-CM

## 2019-11-01 ENCOUNTER — COMMUNICATION - HEALTHEAST (OUTPATIENT)
Dept: FAMILY MEDICINE | Facility: CLINIC | Age: 84
End: 2019-11-01

## 2019-11-07 ENCOUNTER — AMBULATORY - HEALTHEAST (OUTPATIENT)
Dept: OTHER | Facility: CLINIC | Age: 84
End: 2019-11-07

## 2019-11-07 ENCOUNTER — DOCUMENTATION ONLY (OUTPATIENT)
Dept: OTHER | Facility: CLINIC | Age: 84
End: 2019-11-07

## 2019-11-15 ENCOUNTER — AMBULATORY - HEALTHEAST (OUTPATIENT)
Dept: CARDIOLOGY | Facility: CLINIC | Age: 84
End: 2019-11-15

## 2019-11-18 ENCOUNTER — COMMUNICATION - HEALTHEAST (OUTPATIENT)
Dept: FAMILY MEDICINE | Facility: CLINIC | Age: 84
End: 2019-11-18

## 2019-11-22 ENCOUNTER — OFFICE VISIT - HEALTHEAST (OUTPATIENT)
Dept: CARDIOLOGY | Facility: CLINIC | Age: 84
End: 2019-11-22

## 2019-11-22 DIAGNOSIS — I48.0 PAROXYSMAL ATRIAL FIBRILLATION (H): ICD-10-CM

## 2019-11-22 DIAGNOSIS — I25.10 CORONARY ARTERY DISEASE INVOLVING NATIVE CORONARY ARTERY OF NATIVE HEART WITHOUT ANGINA PECTORIS: ICD-10-CM

## 2019-11-22 DIAGNOSIS — E78.5 DYSLIPIDEMIA: ICD-10-CM

## 2019-11-22 DIAGNOSIS — I10 ESSENTIAL HYPERTENSION: ICD-10-CM

## 2019-11-22 ASSESSMENT — MIFFLIN-ST. JEOR: SCORE: 938.45

## 2019-11-25 LAB
ATRIAL RATE - MUSE: 70 BPM
DIASTOLIC BLOOD PRESSURE - MUSE: NORMAL
INTERPRETATION ECG - MUSE: NORMAL
P AXIS - MUSE: NORMAL
PR INTERVAL - MUSE: NORMAL
QRS DURATION - MUSE: 84 MS
QT - MUSE: 394 MS
QTC - MUSE: 474 MS
R AXIS - MUSE: -9 DEGREES
SYSTOLIC BLOOD PRESSURE - MUSE: NORMAL
T AXIS - MUSE: 46 DEGREES
VENTRICULAR RATE- MUSE: 87 BPM

## 2019-12-20 ENCOUNTER — COMMUNICATION - HEALTHEAST (OUTPATIENT)
Dept: SCHEDULING | Facility: CLINIC | Age: 84
End: 2019-12-20

## 2020-01-07 ENCOUNTER — COMMUNICATION - HEALTHEAST (OUTPATIENT)
Dept: FAMILY MEDICINE | Facility: CLINIC | Age: 85
End: 2020-01-07

## 2020-01-09 ENCOUNTER — COMMUNICATION - HEALTHEAST (OUTPATIENT)
Dept: FAMILY MEDICINE | Facility: CLINIC | Age: 85
End: 2020-01-09

## 2020-01-09 ENCOUNTER — AMBULATORY - HEALTHEAST (OUTPATIENT)
Dept: FAMILY MEDICINE | Facility: CLINIC | Age: 85
End: 2020-01-09

## 2020-01-10 ENCOUNTER — COMMUNICATION - HEALTHEAST (OUTPATIENT)
Dept: FAMILY MEDICINE | Facility: CLINIC | Age: 85
End: 2020-01-10

## 2020-01-10 DIAGNOSIS — F33.0 MILD EPISODE OF RECURRENT MAJOR DEPRESSIVE DISORDER (H): ICD-10-CM

## 2020-01-10 DIAGNOSIS — I25.10 CAD (CORONARY ARTERY DISEASE): ICD-10-CM

## 2020-01-10 DIAGNOSIS — E56.9 VITAMIN DEFICIENCY: ICD-10-CM

## 2020-01-10 DIAGNOSIS — G40.209 PARTIAL SYMPTOMATIC EPILEPSY WITH COMPLEX PARTIAL SEIZURES, NOT INTRACTABLE, WITHOUT STATUS EPILEPTICUS (H): ICD-10-CM

## 2020-01-10 DIAGNOSIS — K21.9 GERD (GASTROESOPHAGEAL REFLUX DISEASE): ICD-10-CM

## 2020-01-10 DIAGNOSIS — M54.9 BACK PAIN, UNSPECIFIED BACK LOCATION, UNSPECIFIED BACK PAIN LATERALITY, UNSPECIFIED CHRONICITY: ICD-10-CM

## 2020-01-10 DIAGNOSIS — E78.5 HYPERLIPIDEMIA: ICD-10-CM

## 2020-01-10 DIAGNOSIS — I48.0 PAROXYSMAL ATRIAL FIBRILLATION (H): ICD-10-CM

## 2020-01-10 DIAGNOSIS — I25.10 CORONARY ATHEROSCLEROSIS: ICD-10-CM

## 2020-01-13 ENCOUNTER — COMMUNICATION - HEALTHEAST (OUTPATIENT)
Dept: CARDIOLOGY | Facility: CLINIC | Age: 85
End: 2020-01-13

## 2020-01-13 RX ORDER — LEVETIRACETAM 500 MG/1
TABLET ORAL
Qty: 60 TABLET | Refills: 11 | Status: SHIPPED | OUTPATIENT
Start: 2020-01-13

## 2020-01-13 RX ORDER — CLOPIDOGREL BISULFATE 75 MG/1
TABLET ORAL
Qty: 30 TABLET | Refills: 11 | Status: SHIPPED | OUTPATIENT
Start: 2020-01-13

## 2020-01-13 RX ORDER — MELOXICAM 7.5 MG/1
TABLET ORAL
Qty: 30 TABLET | Refills: 11 | Status: SHIPPED | OUTPATIENT
Start: 2020-01-13

## 2020-01-13 RX ORDER — FLUOXETINE 40 MG/1
CAPSULE ORAL
Qty: 30 CAPSULE | Refills: 11 | Status: SHIPPED | OUTPATIENT
Start: 2020-01-13

## 2020-01-13 RX ORDER — SIMVASTATIN 20 MG
TABLET ORAL
Qty: 30 TABLET | Refills: 11 | Status: SHIPPED | OUTPATIENT
Start: 2020-01-13

## 2020-01-13 RX ORDER — APIXABAN 5 MG/1
TABLET, FILM COATED ORAL
Qty: 60 TABLET | Refills: 11 | Status: SHIPPED | OUTPATIENT
Start: 2020-01-13

## 2020-01-13 RX ORDER — NITROGLYCERIN 0.4 MG/1
TABLET SUBLINGUAL
Qty: 25 TABLET | Refills: 11 | Status: SHIPPED | OUTPATIENT
Start: 2020-01-13

## 2020-01-13 RX ORDER — SPIRONOLACTONE 25 MG/1
TABLET ORAL
Qty: 30 TABLET | Refills: 11 | Status: SHIPPED | OUTPATIENT
Start: 2020-01-13

## 2020-01-21 ENCOUNTER — COMMUNICATION - HEALTHEAST (OUTPATIENT)
Dept: FAMILY MEDICINE | Facility: CLINIC | Age: 85
End: 2020-01-21

## 2020-01-21 DIAGNOSIS — F33.0 MILD EPISODE OF RECURRENT MAJOR DEPRESSIVE DISORDER (H): ICD-10-CM

## 2020-01-25 ENCOUNTER — COMMUNICATION - HEALTHEAST (OUTPATIENT)
Dept: FAMILY MEDICINE | Facility: CLINIC | Age: 85
End: 2020-01-25

## 2020-01-25 DIAGNOSIS — I25.10 CAD (CORONARY ARTERY DISEASE): ICD-10-CM

## 2020-01-25 RX ORDER — ISOSORBIDE MONONITRATE 30 MG/1
TABLET, EXTENDED RELEASE ORAL
Qty: 180 TABLET | Refills: 1 | Status: SHIPPED | OUTPATIENT
Start: 2020-01-25

## 2020-02-08 ENCOUNTER — COMMUNICATION - HEALTHEAST (OUTPATIENT)
Dept: FAMILY MEDICINE | Facility: CLINIC | Age: 85
End: 2020-02-08

## 2020-02-08 DIAGNOSIS — K21.9 GERD (GASTROESOPHAGEAL REFLUX DISEASE): ICD-10-CM

## 2020-03-23 ENCOUNTER — DOCUMENTATION ONLY (OUTPATIENT)
Dept: OTHER | Facility: CLINIC | Age: 85
End: 2020-03-23

## 2020-04-28 ENCOUNTER — COMMUNICATION - HEALTHEAST (OUTPATIENT)
Dept: CARDIOLOGY | Facility: CLINIC | Age: 85
End: 2020-04-28

## 2020-06-23 ENCOUNTER — RECORDS - HEALTHEAST (OUTPATIENT)
Dept: LAB | Facility: CLINIC | Age: 85
End: 2020-06-23

## 2020-06-23 LAB
ANION GAP SERPL CALCULATED.3IONS-SCNC: 8 MMOL/L (ref 5–18)
BUN SERPL-MCNC: 24 MG/DL (ref 8–28)
CALCIUM SERPL-MCNC: 9.1 MG/DL (ref 8.5–10.5)
CHLORIDE BLD-SCNC: 105 MMOL/L (ref 98–107)
CO2 SERPL-SCNC: 26 MMOL/L (ref 22–31)
CREAT SERPL-MCNC: 0.69 MG/DL (ref 0.6–1.1)
ERYTHROCYTE [DISTWIDTH] IN BLOOD BY AUTOMATED COUNT: 13.6 % (ref 11–14.5)
FOLATE SERPL-MCNC: 6.1 NG/ML
GFR SERPL CREATININE-BSD FRML MDRD: >60 ML/MIN/1.73M2
GLUCOSE BLD-MCNC: 108 MG/DL (ref 70–125)
HCT VFR BLD AUTO: 48 % (ref 35–47)
HGB BLD-MCNC: 15.4 G/DL (ref 12–16)
MCH RBC QN AUTO: 30.7 PG (ref 27–34)
MCHC RBC AUTO-ENTMCNC: 32.1 G/DL (ref 32–36)
MCV RBC AUTO: 96 FL (ref 80–100)
PLATELET # BLD AUTO: 170 THOU/UL (ref 140–440)
PMV BLD AUTO: 10.3 FL (ref 8.5–12.5)
POTASSIUM BLD-SCNC: 3.8 MMOL/L (ref 3.5–5)
RBC # BLD AUTO: 5.01 MILL/UL (ref 3.8–5.4)
SODIUM SERPL-SCNC: 139 MMOL/L (ref 136–145)
TSH SERPL DL<=0.005 MIU/L-ACNC: 1 UIU/ML (ref 0.3–5)
VIT B12 SERPL-MCNC: 262 PG/ML (ref 213–816)
WBC: 8.7 THOU/UL (ref 4–11)

## 2020-07-01 ENCOUNTER — RECORDS - HEALTHEAST (OUTPATIENT)
Dept: LAB | Facility: CLINIC | Age: 85
End: 2020-07-01

## 2020-07-01 LAB — LEVETIRACETAM (KEPPRA): 26.1 UG/ML (ref 6–46)

## 2020-07-20 ENCOUNTER — COMMUNICATION - HEALTHEAST (OUTPATIENT)
Dept: FAMILY MEDICINE | Facility: CLINIC | Age: 85
End: 2020-07-20

## 2020-07-20 DIAGNOSIS — I48.20 CHRONIC ATRIAL FIBRILLATION (H): ICD-10-CM

## 2020-07-20 DIAGNOSIS — F33.0 MILD EPISODE OF RECURRENT MAJOR DEPRESSIVE DISORDER (H): ICD-10-CM

## 2020-07-20 DIAGNOSIS — I10 ESSENTIAL HYPERTENSION: ICD-10-CM

## 2020-07-20 DIAGNOSIS — M54.9 BACK PAIN, UNSPECIFIED BACK LOCATION, UNSPECIFIED BACK PAIN LATERALITY, UNSPECIFIED CHRONICITY: ICD-10-CM

## 2020-07-21 RX ORDER — SOTALOL HYDROCHLORIDE 80 MG/1
TABLET ORAL
Qty: 30 TABLET | Refills: 11 | Status: SHIPPED | OUTPATIENT
Start: 2020-07-21

## 2020-07-21 RX ORDER — APIXABAN 2.5 MG/1
TABLET, FILM COATED ORAL
Qty: 60 TABLET | Refills: 11 | Status: SHIPPED | OUTPATIENT
Start: 2020-07-21

## 2020-07-21 RX ORDER — GABAPENTIN 100 MG/1
CAPSULE ORAL
Qty: 90 CAPSULE | Refills: 11 | Status: SHIPPED | OUTPATIENT
Start: 2020-07-21

## 2020-08-24 ENCOUNTER — COMMUNICATION - HEALTHEAST (OUTPATIENT)
Dept: FAMILY MEDICINE | Facility: CLINIC | Age: 85
End: 2020-08-24

## 2020-08-24 DIAGNOSIS — F33.0 MILD EPISODE OF RECURRENT MAJOR DEPRESSIVE DISORDER (H): ICD-10-CM

## 2020-09-08 ENCOUNTER — RECORDS - HEALTHEAST (OUTPATIENT)
Dept: LAB | Facility: CLINIC | Age: 85
End: 2020-09-08

## 2020-09-09 LAB
ANION GAP SERPL CALCULATED.3IONS-SCNC: 10 MMOL/L (ref 5–18)
BNP SERPL-MCNC: 159 PG/ML (ref 0–167)
BUN SERPL-MCNC: 16 MG/DL (ref 8–28)
CALCIUM SERPL-MCNC: 9.3 MG/DL (ref 8.5–10.5)
CHLORIDE BLD-SCNC: 104 MMOL/L (ref 98–107)
CO2 SERPL-SCNC: 27 MMOL/L (ref 22–31)
CREAT SERPL-MCNC: 0.77 MG/DL (ref 0.6–1.1)
ERYTHROCYTE [DISTWIDTH] IN BLOOD BY AUTOMATED COUNT: 13.2 % (ref 11–14.5)
GFR SERPL CREATININE-BSD FRML MDRD: >60 ML/MIN/1.73M2
GLUCOSE BLD-MCNC: 121 MG/DL (ref 70–125)
HCT VFR BLD AUTO: 48.7 % (ref 35–47)
HGB BLD-MCNC: 15.5 G/DL (ref 12–16)
MCH RBC QN AUTO: 31.3 PG (ref 27–34)
MCHC RBC AUTO-ENTMCNC: 31.8 G/DL (ref 32–36)
MCV RBC AUTO: 98 FL (ref 80–100)
PLATELET # BLD AUTO: 224 THOU/UL (ref 140–440)
PMV BLD AUTO: 9.9 FL (ref 8.5–12.5)
POTASSIUM BLD-SCNC: 4.2 MMOL/L (ref 3.5–5)
RBC # BLD AUTO: 4.95 MILL/UL (ref 3.8–5.4)
SODIUM SERPL-SCNC: 141 MMOL/L (ref 136–145)
TSH SERPL DL<=0.005 MIU/L-ACNC: 1.32 UIU/ML (ref 0.3–5)
WBC: 7.8 THOU/UL (ref 4–11)

## 2020-09-10 ENCOUNTER — RECORDS - HEALTHEAST (OUTPATIENT)
Dept: LAB | Facility: CLINIC | Age: 85
End: 2020-09-10

## 2020-09-11 LAB — VIT B12 SERPL-MCNC: 896 PG/ML (ref 213–816)

## 2021-01-08 ENCOUNTER — COMMUNICATION - HEALTHEAST (OUTPATIENT)
Dept: FAMILY MEDICINE | Facility: CLINIC | Age: 86
End: 2021-01-08

## 2021-01-08 DIAGNOSIS — I25.10 CAD (CORONARY ARTERY DISEASE): ICD-10-CM

## 2021-03-08 ENCOUNTER — RECORDS - HEALTHEAST (OUTPATIENT)
Dept: LAB | Facility: CLINIC | Age: 86
End: 2021-03-08

## 2021-03-10 LAB
ANION GAP SERPL CALCULATED.3IONS-SCNC: 10 MMOL/L (ref 5–18)
BASOPHILS # BLD AUTO: 0.1 THOU/UL (ref 0–0.2)
BASOPHILS NFR BLD AUTO: 1 % (ref 0–2)
BUN SERPL-MCNC: 15 MG/DL (ref 8–28)
CALCIUM SERPL-MCNC: 9.4 MG/DL (ref 8.5–10.5)
CHLORIDE BLD-SCNC: 103 MMOL/L (ref 98–107)
CO2 SERPL-SCNC: 28 MMOL/L (ref 22–31)
CREAT SERPL-MCNC: 0.73 MG/DL (ref 0.6–1.1)
EOSINOPHIL # BLD AUTO: 0.1 THOU/UL (ref 0–0.4)
EOSINOPHIL NFR BLD AUTO: 2 % (ref 0–6)
ERYTHROCYTE [DISTWIDTH] IN BLOOD BY AUTOMATED COUNT: 13.1 % (ref 11–14.5)
GFR SERPL CREATININE-BSD FRML MDRD: >60 ML/MIN/1.73M2
GLUCOSE BLD-MCNC: 95 MG/DL (ref 70–125)
HCT VFR BLD AUTO: 50.3 % (ref 35–47)
HGB BLD-MCNC: 15.8 G/DL (ref 12–16)
IMM GRANULOCYTES # BLD: 0 THOU/UL
IMM GRANULOCYTES NFR BLD: 0 %
LYMPHOCYTES # BLD AUTO: 1.8 THOU/UL (ref 0.8–4.4)
LYMPHOCYTES NFR BLD AUTO: 22 % (ref 20–40)
MCH RBC QN AUTO: 30.9 PG (ref 27–34)
MCHC RBC AUTO-ENTMCNC: 31.4 G/DL (ref 32–36)
MCV RBC AUTO: 98 FL (ref 80–100)
MONOCYTES # BLD AUTO: 0.8 THOU/UL (ref 0–0.9)
MONOCYTES NFR BLD AUTO: 10 % (ref 2–10)
NEUTROPHILS # BLD AUTO: 5.4 THOU/UL (ref 2–7.7)
NEUTROPHILS NFR BLD AUTO: 66 % (ref 50–70)
PLATELET # BLD AUTO: 204 THOU/UL (ref 140–440)
PMV BLD AUTO: 9.9 FL (ref 8.5–12.5)
POTASSIUM BLD-SCNC: 4.8 MMOL/L (ref 3.5–5)
RBC # BLD AUTO: 5.11 MILL/UL (ref 3.8–5.4)
SODIUM SERPL-SCNC: 141 MMOL/L (ref 136–145)
WBC: 8.2 THOU/UL (ref 4–11)

## 2021-05-06 ENCOUNTER — RECORDS - HEALTHEAST (OUTPATIENT)
Dept: TELEHEALTH | Facility: CLINIC | Age: 86
End: 2021-05-06

## 2021-05-06 ENCOUNTER — COMMUNICATION - HEALTHEAST (OUTPATIENT)
Dept: FAMILY MEDICINE | Facility: CLINIC | Age: 86
End: 2021-05-06

## 2021-05-25 ENCOUNTER — RECORDS - HEALTHEAST (OUTPATIENT)
Dept: ADMINISTRATIVE | Facility: CLINIC | Age: 86
End: 2021-05-25

## 2021-05-26 ENCOUNTER — RECORDS - HEALTHEAST (OUTPATIENT)
Dept: ADMINISTRATIVE | Facility: CLINIC | Age: 86
End: 2021-05-26

## 2021-05-27 ENCOUNTER — RECORDS - HEALTHEAST (OUTPATIENT)
Dept: ADMINISTRATIVE | Facility: CLINIC | Age: 86
End: 2021-05-27

## 2021-05-27 NOTE — TELEPHONE ENCOUNTER
Refill Approved    Rx renewed per Medication Renewal Policy. Medication was last renewed on 10/6/18.    Linn Jensen, [facility] Triage/Med Refill 4/18/2019     Requested Prescriptions   Pending Prescriptions Disp Refills     simvastatin (ZOCOR) 20 MG tablet [Pharmacy Med Name: SIMVASTATIN 20 MG TABLET] 90 tablet 2     Sig: TAKE 1 TABLET BY MOUTH EVERYDAY AT BEDTIME       Statins Refill Protocol (Hmg CoA Reductase Inhibitors) Passed - 4/16/2019  8:15 AM        Passed - PCP or prescribing provider visit in past 12 months      Last office visit with prescriber/PCP: 1/15/2019 Lela Randolph MD OR same dept: 1/15/2019 Lela Randolph MD OR same specialty: 1/15/2019 Lela Randolph MD  Last physical: Visit date not found Last MTM visit: Visit date not found   Next visit within 3 mo: Visit date not found  Next physical within 3 mo: Visit date not found  Prescriber OR PCP: Lela Randolph MD  Last diagnosis associated with med order: 1. Hyperlipidemia  - simvastatin (ZOCOR) 20 MG tablet [Pharmacy Med Name: SIMVASTATIN 20 MG TABLET]; TAKE 1 TABLET BY MOUTH EVERYDAY AT BEDTIME  Dispense: 90 tablet; Refill: 2    If protocol passes may refill for 12 months if within 3 months of last provider visit (or a total of 15 months).

## 2021-05-27 NOTE — TELEPHONE ENCOUNTER
RN cannot approve Refill Request    RN can NOT refill this medication it is not found on the medication list     Garrison Campbell, [facility] Triage/Med Refill 4/18/2019    Requested Prescriptions   Pending Prescriptions Disp Refills     traZODone (DESYREL) 50 MG tablet [Pharmacy Med Name: TRAZODONE 50 MG TABLET] 90 tablet 0     Sig: TAKE 0.5 TABLETS BY MOUTH NIGHTLY AT BEDTIME       Tricyclics/Misc Antidepressant/Antianxiety Meds Refill Protocol Passed - 4/17/2019  1:17 AM        Passed - PCP or prescribing provider visit in last year     Last office visit with prescriber/PCP: 1/15/2019 Lela Randolph MD OR same dept: Visit date not found OR same specialty: Visit date not found  Last physical: Visit date not found Last MTM visit: Visit date not found   Next visit within 3 mo: Visit date not found  Next physical within 3 mo: Visit date not found  Prescriber OR PCP: Lela Randolph MD  Last diagnosis associated with med order: There are no diagnoses linked to this encounter.  If protocol passes may refill for 12 months if within 3 months of last provider visit (or a total of 15 months).

## 2021-05-27 NOTE — TELEPHONE ENCOUNTER
[facility] recruitment: Attempt 1, 3/26/19  Outcome: Spoke with Jacklyn at length. She does medication reviews with her doctor and asks questions from her pharmacist when curious. Said her daughter supports her medication needs (travel to clinic, picking up meds, reorders). Gave CB number if she has a chance to talk with daughter, otherwise CB in 2 weeks to see if she is able to schedule.  Angel Dwyer, MTM Intern

## 2021-05-27 NOTE — TELEPHONE ENCOUNTER
RN cannot approve Refill Request    RN can NOT refill this medication med is not covered by policy/route to provider.    Garrison Campbell, [facility] Triage/Med Refill 3/27/2019    Requested Prescriptions   Pending Prescriptions Disp Refills     meloxicam (MOBIC) 7.5 MG tablet [Pharmacy Med Name: MELOXICAM 7.5 MG TABLET] 90 tablet 1     Sig: TAKE 1 TABLET (7.5 MG TOTAL) BY MOUTH DAILY.    There is no refill protocol information for this order

## 2021-05-27 NOTE — TELEPHONE ENCOUNTER
RN cannot approve Refill Request    RN can NOT refill this medication PCP messaged that patient is overdue for Labs.         Linn Jensen, [facility] Triage/Med Refill 4/3/2019    Requested Prescriptions   Pending Prescriptions Disp Refills     sotalol (BETAPACE) 120 MG tablet [Pharmacy Med Name: SOTALOL 120 MG TABLET] 90 tablet 3     Sig: TAKE ONE TABLET BY MOUTH ONE TIME DAILY    Sotalol Refill Protocol Failed - 4/2/2019  1:41 AM       Failed - LFT or AST or ALT on file in last 12 months    Albumin   Date Value Ref Range Status   03/16/2018 3.6 3.5 - 5.0 g/dL Final     Bilirubin, Total   Date Value Ref Range Status   03/16/2018 0.6 0.0 - 1.0 mg/dL Final     Bilirubin, Direct   Date Value Ref Range Status   03/16/2018 0.2 <=0.5 mg/dL Final     Alkaline Phosphatase   Date Value Ref Range Status   03/16/2018 54 45 - 120 U/L Final     AST   Date Value Ref Range Status   03/16/2018 16 0 - 40 U/L Final     ALT   Date Value Ref Range Status   03/16/2018 10 0 - 45 U/L Final     Protein, Total   Date Value Ref Range Status   03/16/2018 6.3 6.0 - 8.0 g/dL Final               Passed - PCP or prescribing provider visit in past 6 months or next 3 months    Last office visit with prescriber/PCP: 1/15/2019 OR same dept: 1/15/2019 Lela Randolph MD OR same specialty: 1/15/2019 Lela Randolph MD Last physical: Visit date not found Last MTM visit: Visit date not found     Next appt within 3 mo: Visit date not found  Next physical within 3 mo: Visit date not found  Prescriber OR PCP: Lela Randolph MD  Last diagnosis associated with med order: 1. Paroxysmal atrial fibrillation (H)  - sotalol (BETAPACE) 120 MG tablet [Pharmacy Med Name: SOTALOL 120 MG TABLET]; TAKE ONE TABLET BY MOUTH ONE TIME DAILY  Dispense: 90 tablet; Refill: 3    If protocol passes may refill for 6 months if within 3 months of last provider visit (or a total of 9 months).             Passed - BMP on file in last 12 months    Sodium    Date Value Ref Range Status   09/14/2018 140 136 - 145 mmol/L Final     Potassium   Date Value Ref Range Status   09/14/2018 4.0 3.5 - 5.0 mmol/L Final     Chloride   Date Value Ref Range Status   09/14/2018 104 98 - 107 mmol/L Final     CO2   Date Value Ref Range Status   09/14/2018 29 22 - 31 mmol/L Final     BUN   Date Value Ref Range Status   09/14/2018 20 8 - 28 mg/dL Final     Creatinine   Date Value Ref Range Status   09/14/2018 0.68 0.60 - 1.10 mg/dL Final            Passed - CBC w/plts (hm2) on file in last 12 months    WBC   Date Value Ref Range Status   09/14/2018 5.7 4.0 - 11.0 thou/uL Final     Hemoglobin   Date Value Ref Range Status   09/14/2018 15.6 12.0 - 16.0 g/dL Final     Hematocrit   Date Value Ref Range Status   09/14/2018 47.8 (H) 35.0 - 47.0 % Final     Platelets   Date Value Ref Range Status   09/14/2018 165 140 - 440 thou/uL Final            Passed - ECG in last 12 months    ECG rhythm strip: No results found for this or any previous visit. ECG 12 lead MUSE:   Results for orders placed or performed during the hospital encounter of 03/16/18   ECG 12 lead with MUSE   Result Value Ref Range    SYSTOLIC BLOOD PRESSURE  mmHg    DIASTOLIC BLOOD PRESSURE  mmHg    VENTRICULAR RATE 74 BPM    ATRIAL RATE 66 BPM    P-R INTERVAL  ms    QRS DURATION 82 ms    Q-T INTERVAL 408 ms    QTC CALCULATION (BEZET) 452 ms    P Axis  degrees    R AXIS 3 degrees    T AXIS 47 degrees    MUSE DIAGNOSIS       Atrial fibrillation  Abnormal ECG  When compared with ECG of 16-MAR-2018 21:27,  Atrial fibrillation has replaced Sinus rhythm  Confirmed by SHAQUILLE KELLEY MD LOC:JN ([id number]) on 3/19/2018 3:48:55 PM      ECG 12 lead nursing unit:   Results for orders placed or performed during the hospital encounter of 09/14/18   ECG 12 lead nursing unit performed   Result Value Ref Range    SYSTOLIC BLOOD PRESSURE 173 mmHg    DIASTOLIC BLOOD PRESSURE 98 mmHg    VENTRICULAR RATE 55 BPM    ATRIAL RATE 55 BPM    P-R INTERVAL  ms     QRS DURATION 86 ms    Q-T INTERVAL 460 ms    QTC CALCULATION (BEZET) 440 ms    P Axis 54 degrees    R AXIS -21 degrees    T AXIS 75 degrees    MUSE DIAGNOSIS       Marked sinus bradycardia with junctional escape complexes  Moderate voltage criteria for LVH, may be normal variant  Nonspecific T wave abnormality  Abnormal ECG  When compared with ECG of 19-MAR-2018 05:43,  Current undetermined rhythm precludes rhythm comparison, needs review  Confirmed by LORENZO CASAS MD LOC:JN ([id number]) on 9/14/2018 3:27:13 PM              Passed - Magnesium in last 12 months    Magnesium   Date Value Ref Range Status   09/14/2018 2.0 1.8 - 2.6 mg/dL Final             Passed - Serum creatinine in last 12 months    Creatinine   Date Value Ref Range Status   09/14/2018 0.68 0.60 - 1.10 mg/dL Final

## 2021-05-28 NOTE — PROGRESS NOTES
MTM Initial Encounter  Assessment & Plan                                                     Pain: Patient is using APAP PRN, which is appropriate. Reviewed risks of long-term NSAID use.     CAD: Patient to continue current medications at this time.     Atrial Fibrillation: Per patient, she does not have sotalol. I will message Dr. Singh to see if he would like to see patient for an appointment, or simply have her restart. Patient is not on anticoagulation, likely due to her fall risk.   PLAN:   1. I will message Dr. Singh about sotalol.   Connie Singh MD sent to Joanne Mccain, PharmD             I would recommend patient continue with the sotalol.  She had been on a lower dose and recommend 40 mg twice daily.  Thanks.    Spoke to patient on 5/6/19 -- refilled for 40 mg two times a day.     Hypertension: Patient is taking medications on file. Last K WNL. Patient is having dizziness upon standing -- reviewed not standing up too quick and to have something to hold onto. Appears that patient is having frequent falls -- consider eval by Dr. Randolph and patient may need additional help.     Depression: Patient expressed suicidal ideations on the phone, but does not have a plan. Reviewed a trial of increasing fluoxetine to 40 mg -- patient agreed. Will send new prescription and call her in a few weeks to follow up.   PLAN:   1. Increase fluoxetine to 40 mg daily.   **started higher dose today.     Seizure history:  Patient seemed unaware about her seizure history. Will have her continue for now, but consider follow up with neurology.    GERD: Stable, will have her continue omeprazole.     Follow Up  After talking to cardiology     Subjective & Objective                                                     Jacklyn Thornton is a 87 y.o. female called for an initial visit for Medication Therapy Management. She was referred to me from [facility] program    Chief Complaint: Reports that she takes APAP  instead of hydrocodone-APAP.     Medication Adherence/Access: Has medications in front of her today.     Pain: Has hydrocodone acetaminophen 5-325 mg, but she is not taking. Taking APAP extra strength in the morning when she gets up and HS. Reports her pain is a little bit of everything -- arm, shoulder. Helpful.  Also taking meloxicam 7.5 mg once daily with food. Reports that she is not taking other NSAIDs.     CAD: Currently taking clopidogrel 75 mg daily, isosorbide and simvastatin 20 mg daily HS.  Aspirin was discontinued in 2019 -she is not taking.  History of drug-eluting stent and right subcortical infarction -- wishes she would have  then. Denies significant bleeding.  Last use nitro none recently  Last lipids checked 3/17/2018    Atrial Fibrillation: Currently taking Sotalol 80 mg once daily -- she does not have at home.  During  to 9/15/2018 hospitalization sotalol was decreased from 120 to 80 mg once daily due to bradycardia.  No anticoagulation.  Currently sees a cardiologist - Dr. Singh.   Reports sometimes she feels a sharp pain by her heart but it goes away quickly  Denies significant bleeding/bruising.   [id number] score = 6.     Hypertension: Currently taking isosorbide 30 mg twice daily (increased during  to 9/15/2018 hospitalization) and spironolactone 25 mg daily. Patient does monitor BP at home once in a while. Home BP reports similar to clinic values -- does not have values to report. Reports dizziness when she gets up in the morning - she sits on the edge of the bed and waits until she feels stable then will get up. Usually in the morning or a late afternoon nap if 2-3 hours. Reports last fall not for a while since someone is usually with her.   Last potassium = 4 on 2018    Depression: Currently taking fluoxetine 20 mg daily.   Reports suicidal thoughts and no plan. Patient seemed confused on the phone and hard to follow my questions. Reports mood is sometimes  "good and sometimes does not want to live.  Dose increased by PCP on 1/15/2019.  Of note patient had neuropsych eval on 4/5/2018 which showed major neurocognitive disorder primarily vascular.    Seizure history:  Patient started on Keppra 500 mg twice a day during hospitalization from 3/16-3/19.  Per chart review Keppra was started for possible complex partial seizure. She seemed very confused on the phone about a seizure -- she does not know what a \"brain seizure\" is.     GERD: Currently taking omeprazole 20 mg once daily. Reports symptoms depending on eating late, but overall helping.         PMH: reviewed in EPIC   Allergies/ADRs: reviewed in EPIC   Alcohol: reviewed in EPIC   Tobacco:   Social History     Tobacco Use   Smoking Status Former Smoker   Smokeless Tobacco Never Used   Tobacco Comment    \"quit 50 yrs ago\"     Today's Vitals: There were no vitals filed for this visit.  ----------------    Much or all of the text in this note was generated through the use of Dragon Dictate voice-to-text software. Errors in spelling or words which seem out of context are unintentional. Sound alike errors, in particular, may have escaped editing.    The patient was given a CMS standardized format medication action plan    I spent 30 minutes with this patient today. An extra 15 minutes was spent creating the Medication Action Plan. All changes were made via collaborative practice agreement with Lela Randolph MD. A copy of the visit note was provided to the patient's provider.     Joanne Mccain, Pharm.D., [facility]  Medication Therapy Management Pharmacist  [facility]     Current Outpatient Medications   Medication Sig Dispense Refill     cholecalciferol, vitamin D3, (VITAMIN D3) 2,000 unit cap Take 2,000 Units by mouth daily.       clopidogrel (PLAVIX) 75 mg tablet Take 1 tablet (75 mg total) by mouth daily. 30 tablet 5     FLUoxetine (PROZAC) 20 MG capsule Take 1 capsule (20 mg total) by mouth " daily. 90 capsule 3     HYDROcodone-acetaminophen 5-325 mg per tablet Take 1-2 tablets by mouth every 6 (six) hours as needed for pain. 30 tablet 0     isosorbide mononitrate (IMDUR) 30 MG 24 hr tablet Take 1 tablet (30 mg total) by mouth 2 (two) times a day. 180 tablet 3     levETIRAcetam (KEPPRA) 500 MG tablet TAKE 1 TABLET BY MOUTH TWICE A  tablet 3     nitroglycerin (NITROSTAT) 0.4 MG SL tablet Place 1 tablet (0.4 mg total) under the tongue every 5 (five) minutes as needed for chest pain. 20 tablet 0     omeprazole (PRILOSEC) 20 MG capsule Take 20 mg by mouth Daily before breakfast.       omeprazole (PRILOSEC) 20 MG capsule TAKE ONE CAPSULE BY MOUTH ONE TIME DAILY 90 capsule 1     simvastatin (ZOCOR) 20 MG tablet TAKE 1 TABLET BY MOUTH EVERYDAY AT BEDTIME 90 tablet 2     sotalol (BETAPACE) 80 MG tablet Take 1 tablet (80 mg total) by mouth daily. 30 tablet 0     spironolactone (ALDACTONE) 25 MG tablet TAKE ONE TABLET BY MOUTH ONE TIME DAILY 90 tablet 3     No current facility-administered medications for this visit.

## 2021-05-28 NOTE — TELEPHONE ENCOUNTER
RN cannot approve Refill Request    RN can NOT refill this medication med is not covered by policy/route to provider. Last office visit: 1/15/2019 Lela Randolph MD Last Physical: Visit date not found Last MTM visit: Visit date not found Last visit same specialty: 1/15/2019 Lela Randolph MD.  Next visit within 3 mo: Visit date not found  Next physical within 3 mo: Visit date not found      Nelly Flroes, [facility] Triage/Med Refill 5/14/2019    Requested Prescriptions   Pending Prescriptions Disp Refills     meloxicam (MOBIC) 7.5 MG tablet [Pharmacy Med Name: MELOXICAM 7.5 MG TABLET] 90 tablet 1     Sig: TAKE 1 TABLET (7.5 MG TOTAL) BY MOUTH DAILY.       There is no refill protocol information for this order

## 2021-05-29 ENCOUNTER — RECORDS - HEALTHEAST (OUTPATIENT)
Dept: ADMINISTRATIVE | Facility: CLINIC | Age: 86
End: 2021-05-29

## 2021-05-29 NOTE — TELEPHONE ENCOUNTER
Refill Approved    Rx renewed per Medication Renewal Policy. Medication was last renewed on 1.15.19.    Sheela Talbert, [facility] Triage/Med Refill 5/27/2019     Requested Prescriptions   Signed Prescriptions Disp Refills    FLUoxetine (PROZAC) 40 MG capsule 30 capsule 0     Sig: TAKE 1 CAPSULE BY MOUTH EVERY DAY       SSRI Refill Protocol  Passed - 5/27/2019  7:36 AM        Passed - PCP or prescribing provider visit in last year     Last office visit with prescriber/PCP: 1/15/2019 Lela Randolph MD OR same dept: 1/15/2019 Lela Randolph MD OR same specialty: 1/15/2019 Lela Randolph MD  Last physical: Visit date not found Last MTM visit: Visit date not found   Next visit within 3 mo: Visit date not found  Next physical within 3 mo: Visit date not found  Prescriber OR PCP: Lela Randolph MD  Last diagnosis associated with med order: 1. Mild episode of recurrent major depressive disorder (H)  - FLUoxetine (PROZAC) 40 MG capsule; TAKE 1 CAPSULE BY MOUTH EVERY DAY  Dispense: 30 capsule; Refill: 0    If protocol passes may refill for 12 months if within 3 months of last provider visit (or a total of 15 months).

## 2021-05-30 ENCOUNTER — RECORDS - HEALTHEAST (OUTPATIENT)
Dept: ADMINISTRATIVE | Facility: CLINIC | Age: 86
End: 2021-05-30

## 2021-05-30 NOTE — TELEPHONE ENCOUNTER
Refill Request  Did you contact pharmacy: No  Medication name:   Requested Prescriptions     Pending Prescriptions Disp Refills     FLUoxetine (PROZAC) 40 MG capsule  0     Sig: Take by mouth daily.     Who prescribed the medication: Dr Randolph  Pharmacy Name and Location: [facility]  Fax received from pharmacy states patient is asking for 90 day supply.  Okay to leave a detailed message: yes

## 2021-05-30 NOTE — TELEPHONE ENCOUNTER
RN cannot approve Refill Request    RN can NOT refill this medication med is not covered by policy/route to provider.    Garrison Campbell, [facility] Triage/Med Refill 6/27/2019    Requested Prescriptions   Pending Prescriptions Disp Refills     meloxicam (MOBIC) 7.5 MG tablet [Pharmacy Med Name: MELOXICAM 7.5 MG TABLET] 90 tablet 1     Sig: TAKE 1 TABLET (7.5 MG TOTAL) BY MOUTH DAILY.       There is no refill protocol information for this order      Refused Prescriptions Disp Refills     clopidogrel (PLAVIX) 75 mg tablet [Pharmacy Med Name: CLOPIDOGREL 75 MG TABLET] 90 tablet 1     Sig: TAKE 1 TABLET BY MOUTH EVERY DAY       Clopidogrel/Prasugrel/Ticagrelor Refill Protocol Passed - 6/26/2019  2:21 AM        Passed - PCP or prescribing provider visit in past 6 months       Last office visit with prescriber/PCP: 1/15/2019 OR same dept: 1/15/2019 Lela Randolph MD OR same specialty: 1/15/2019 Lela Randolph MD Last physical: Visit date not found Last MTM visit: Visit date not found     Next appt within 3 mo: Visit date not found  Next physical within 3 mo: Visit date not found  Prescriber OR PCP: Lela Randolph MD  Last diagnosis associated with med order: 1. Rt Subcortical infarction  - clopidogrel (PLAVIX) 75 mg tablet [Pharmacy Med Name: CLOPIDOGREL 75 MG TABLET]; TAKE 1 TABLET BY MOUTH EVERY DAY  Dispense: 90 tablet; Refill: 1    If protocol passes may refill for 6 months if within 3 months of last provider visit (or a total of 9 months).              Passed - Hemoglobin in past 12 months     Hemoglobin   Date Value Ref Range Status   09/14/2018 15.6 12.0 - 16.0 g/dL Final

## 2021-05-30 NOTE — TELEPHONE ENCOUNTER
Patient needs to check with her family to see who can bring her in for an appointment to see dr hernandez to discuss medication

## 2021-05-30 NOTE — TELEPHONE ENCOUNTER
.Left message to call back for: pt  Information to relay to patient:  Left message to call and schedule ofv to discuss meloxicam with dr hernandez.

## 2021-05-30 NOTE — TELEPHONE ENCOUNTER
Refill Approved    Rx renewed per Medication Renewal Policy. Medication was last renewed on 5/27/19.    Linn Jensen, [facility] Triage/Med Refill 7/25/2019     Requested Prescriptions   Pending Prescriptions Disp Refills     FLUoxetine (PROZAC) 40 MG capsule  0     Sig: Take by mouth daily.       SSRI Refill Protocol  Passed - 7/25/2019  8:09 AM        Passed - PCP or prescribing provider visit in last year     Last office visit with prescriber/PCP: 1/15/2019 Lela Randolph MD OR same dept: 1/15/2019 eLla Randolph MD OR same specialty: 1/15/2019 Lela Randolph MD  Last physical: Visit date not found Last MTM visit: Visit date not found   Next visit within 3 mo: Visit date not found  Next physical within 3 mo: Visit date not found  Prescriber OR PCP: Lela Randolph MD  Last diagnosis associated with med order: 1. Mild episode of recurrent major depressive disorder (H)  - FLUoxetine (PROZAC) 40 MG capsule; Take by mouth daily.; Refill: 0    If protocol passes may refill for 12 months if within 3 months of last provider visit (or a total of 15 months).

## 2021-05-31 VITALS — WEIGHT: 135 LBS | HEIGHT: 59 IN | BODY MASS INDEX: 27.21 KG/M2

## 2021-05-31 VITALS — BODY MASS INDEX: 27.21 KG/M2 | HEIGHT: 59 IN | WEIGHT: 135 LBS

## 2021-05-31 VITALS — BODY MASS INDEX: 27.27 KG/M2 | WEIGHT: 135 LBS

## 2021-05-31 NOTE — TELEPHONE ENCOUNTER
Refill Approved    Rx renewed per Medication Renewal Policy. Medication was last renewed on 8/29/18, last OV 1/15/19.    Yolie Hines, [facility] Triage/Med Refill 8/4/2019     Requested Prescriptions   Pending Prescriptions Disp Refills     spironolactone (ALDACTONE) 25 MG tablet [Pharmacy Med Name: SPIRONOLACTONE 25 MG TABLET] 90 tablet 3     Sig: TAKE 1 TABLET BY MOUTH EVERY DAY       Diuretics/Combination Diuretics Refill Protocol  Passed - 8/4/2019 12:23 AM        Passed - Visit with PCP or prescribing provider visit in past 12 months     Last office visit with prescriber/PCP: 1/15/2019 Lela Randolph MD OR same dept: 1/15/2019 Lela Randolph MD OR same specialty: 1/15/2019 Lela Randolph MD  Last physical: Visit date not found Last MTM visit: Visit date not found   Next visit within 3 mo: Visit date not found  Next physical within 3 mo: Visit date not found  Prescriber OR PCP: Lela Randolph MD  Last diagnosis associated with med order: 1. Coronary atherosclerosis  - spironolactone (ALDACTONE) 25 MG tablet [Pharmacy Med Name: SPIRONOLACTONE 25 MG TABLET]; TAKE 1 TABLET BY MOUTH EVERY DAY  Dispense: 90 tablet; Refill: 3    If protocol passes may refill for 12 months if within 3 months of last provider visit (or a total of 15 months).             Passed - Serum Potassium in past 12 months      Lab Results   Component Value Date    Potassium 4.0 09/14/2018             Passed - Serum Sodium in past 12 months      Lab Results   Component Value Date    Sodium 140 09/14/2018             Passed - Blood pressure on file in past 12 months     BP Readings from Last 1 Encounters:   01/15/19 124/84             Passed - Serum Creatinine in past 12 months      Creatinine   Date Value Ref Range Status   09/14/2018 0.68 0.60 - 1.10 mg/dL Final

## 2021-05-31 NOTE — TELEPHONE ENCOUNTER
RN cannot approve Refill Request    RN can NOT refill this medication med is not covered by policy/route to provider. Last office visit: 1/15/2019 Lela Randolph MD Last Physical: Visit date not found Last MTM visit: Visit date not found Last visit same specialty: 1/15/2019 Lela Randolph MD.  Next visit within 3 mo: Visit date not found  Next physical within 3 mo: Visit date not found      Nelly Flores, [facility] Triage/Med Refill 8/1/2019    Requested Prescriptions   Pending Prescriptions Disp Refills     meloxicam (MOBIC) 7.5 MG tablet [Pharmacy Med Name: MELOXICAM 7.5 MG TABLET] 30 tablet 0     Sig: TAKE 1 TABLET BY MOUTH EVERY DAY       There is no refill protocol information for this order

## 2021-05-31 NOTE — TELEPHONE ENCOUNTER
Refill Approved    Rx renewed per Medication Renewal Policy. Medication was last renewed on 2/20/2019.  Last OV 1/15/2019.    Munira Mckay, [facility] [place] Connection Triage/Med Refill 8/15/2019     Requested Prescriptions   Pending Prescriptions Disp Refills     omeprazole (PRILOSEC) 20 MG capsule [Pharmacy Med Name: OMEPRAZOLE DR 20 MG CAPSULE] 90 capsule 1     Sig: TAKE 1 CAPSULE BY MOUTH EVERY DAY       GI Medications Refill Protocol Passed - 8/15/2019  1:30 AM        Passed - PCP or prescribing provider visit in last 12 or next 3 months.     Last office visit with prescriber/PCP: Visit date not found OR same dept: 1/15/2019 Lela Randolph MD OR same specialty: 1/15/2019 Lela Randolph MD  Last physical: Visit date not found Last MTM visit: Visit date not found   Next visit within 3 mo: Visit date not found  Next physical within 3 mo: Visit date not found  Prescriber OR PCP: Nikkie Lance MD  Last diagnosis associated with med order: 1. GERD (gastroesophageal reflux disease)  - omeprazole (PRILOSEC) 20 MG capsule [Pharmacy Med Name: OMEPRAZOLE DR 20 MG CAPSULE]; TAKE 1 CAPSULE BY MOUTH EVERY DAY  Dispense: 90 capsule; Refill: 1    If protocol passes may refill for 12 months if within 3 months of last provider visit (or a total of 15 months).

## 2021-06-01 ENCOUNTER — RECORDS - HEALTHEAST (OUTPATIENT)
Dept: ADMINISTRATIVE | Facility: CLINIC | Age: 86
End: 2021-06-01

## 2021-06-01 VITALS — BODY MASS INDEX: 27.27 KG/M2 | HEIGHT: 59 IN

## 2021-06-01 NOTE — TELEPHONE ENCOUNTER
Last refill before patient needs to be seen again. Long term use of this medication can increase risk of bleeding.

## 2021-06-01 NOTE — TELEPHONE ENCOUNTER
RN cannot approve Refill Request    RN can NOT refill this medication med is not covered by policy/route to provider. Last office visit: 1/15/2019 Lela Randolph MD Last Physical: Visit date not found Last MTM visit: Visit date not found Last visit same specialty: 1/15/2019 Lela Randolph MD.  Next visit within 3 mo: Visit date not found  Next physical within 3 mo: Visit date not found      Nelly Flores, [facility] Triage/Med Refill 9/7/2019    Requested Prescriptions   Pending Prescriptions Disp Refills     meloxicam (MOBIC) 7.5 MG tablet [Pharmacy Med Name: MELOXICAM 7.5 MG TABLET] 30 tablet 0     Sig: TAKE 1 TABLET BY MOUTH EVERY DAY       There is no refill protocol information for this order

## 2021-06-01 NOTE — TELEPHONE ENCOUNTER
RN cannot approve Refill Request    RN can NOT refill this medication Protocol failed and NO refill given.       Linn Jensen, [facility] Triage/Med Refill 9/18/2019    Requested Prescriptions   Pending Prescriptions Disp Refills     clopidogrel (PLAVIX) 75 mg tablet [Pharmacy Med Name: CLOPIDOGREL 75 MG TABLET] 90 tablet 3     Sig: TAKE 1 TABLET BY MOUTH EVERY DAY       Clopidogrel/Prasugrel/Ticagrelor Refill Protocol Failed - 9/18/2019  1:44 AM        Failed - PCP or prescribing provider visit in past 6 months       Last office visit with prescriber/PCP: Visit date not found OR same dept: 1/15/2019 Lela Randolph MD OR same specialty: 1/15/2019 Lela Randolph MD Last physical: Visit date not found Last MTM visit: Visit date not found     Next appt within 3 mo: Visit date not found  Next physical within 3 mo: Visit date not found  Prescriber OR PCP: Lela Randolph MD  Last diagnosis associated with med order: 1. Rt Subcortical infarction  - clopidogrel (PLAVIX) 75 mg tablet [Pharmacy Med Name: CLOPIDOGREL 75 MG TABLET]; TAKE 1 TABLET BY MOUTH EVERY DAY  Dispense: 90 tablet; Refill: 0    If protocol passes may refill for 6 months if within 3 months of last provider visit (or a total of 9 months).              Failed - Hemoglobin in past 12 months     Hemoglobin   Date Value Ref Range Status   09/14/2018 15.6 12.0 - 16.0 g/dL Final

## 2021-06-02 ENCOUNTER — RECORDS - HEALTHEAST (OUTPATIENT)
Dept: ADMINISTRATIVE | Facility: CLINIC | Age: 86
End: 2021-06-02

## 2021-06-02 VITALS — WEIGHT: 142 LBS | BODY MASS INDEX: 28.63 KG/M2 | HEIGHT: 59 IN

## 2021-06-02 VITALS — HEIGHT: 59 IN | WEIGHT: 140 LBS | BODY MASS INDEX: 28.22 KG/M2

## 2021-06-02 NOTE — TELEPHONE ENCOUNTER
RN cannot approve Refill Request    RN can NOT refill this medication med is not covered by policy/route to provider     . Last office visit: 1/15/2019 Lela Randolph MD Last Physical: Visit date not found Last MTM visit: Visit date not found Last visit same specialty: 1/15/2019 Lela Randolph MD.  Next visit within 3 mo: Visit date not found  Next physical within 3 mo: Visit date not found      Linn Jensen, [facility] Triage/Med Refill 10/15/2019    Requested Prescriptions   Pending Prescriptions Disp Refills     meloxicam (MOBIC) 7.5 MG tablet [Pharmacy Med Name: MELOXICAM 7.5 MG TABLET] 30 tablet 0     Sig: TAKE 1 TABLET BY MOUTH EVERY DAY. LAST REFILL BEFORE FOLLOW UP NEEDED       There is no refill protocol information for this order

## 2021-06-02 NOTE — TELEPHONE ENCOUNTER
Patient Returning Call  Reason for call:  orders  Information relayed to patient:  OK for order per Dr Randolph as follows : Home Care Occupational Therapy     1 time a week for 5 weeks  o work on home safety, walker management and ADL's training.   Okay to leave a detailed message?: No call back needed

## 2021-06-02 NOTE — TELEPHONE ENCOUNTER
Reason contacted:  Orders request  Information relayed:  Notified ok for order per Dr. Randolph. Please advise if this is not ok.   Additional questions:  No  Further follow-up needed:  No  Okay to leave a detailed message:  No

## 2021-06-02 NOTE — TELEPHONE ENCOUNTER
Left message to call back for: orders request  Information to relay to patient:  Notified ok for requested order per Dr. Randolph via VM. Please advise if this is not ok.

## 2021-06-02 NOTE — TELEPHONE ENCOUNTER
Reason contacted:  Orders request  Information relayed:  Notified ok for requested order per Dr. Randolph. Please advise if this is not ok.   Additional questions:  No  Further follow-up needed:  No  Okay to leave a detailed message:  No

## 2021-06-02 NOTE — TELEPHONE ENCOUNTER
Orders being requested: Ok to delay start of care to 10/21/19  Reason service is needed/diagnosis: Patient's request  When are orders needed by: today  Where to send Orders: Verbal order is ok.  Do leave name and credientals.  Okay to leave detailed message?  Yes

## 2021-06-02 NOTE — PROGRESS NOTES
Assessment/Plan:     Patient presents to clinic status post a single overnight admission for TIA but with a complex medical history.  Most of today's visit was spent discussing the risks of anticoagulation and the risks of not anticoagulating the patient.  I discussed this with the patient who followed only some of the conversation, her daughter, and a daughter who was present over the phone.    Patient had been deliberately not anticoagulated secondary to age and comorbidities, however with this new TIA, the revisitation of this discussion seemed appropriate.  Although originally torn between the 2 options, I became convinced throughout the course of our interview that patient would likely find anticoagulation detrimental to quality of life.  The invasive monitoring, dietary restrictions, and was reportedly, her severe risk of falls, are very concerning.    During the examination I attempted to have patient sit on my examination table the patient was unable to climb the step, even with significant assistance by both myself and her daughter.  She seems very unsteady on her feet and is already working with physical therapy.  I do not feel the patient would be a candidate for anticoagulation and I discussed my concerns with the family.  However, I understand there is significant risk and would have them follow-up with cardiology within the next 2 weeks for a second opinion.  I will defer to their judgment.    1. Inpatient hospitalization within last 30 days  2. TIA (transient ischemic attack)  3. Hypertension  4. 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 Rt Subcortical infarction  6. Coronary artery disease involving native coronary artery of native heart without angina pectoris  7. Chronic atrial fibrillation    Discontinued Medications:  There are no discontinued medications.    AVS printed and given to patient.  Return to clinic in 8  "weeks.    Total time spent with patient was 40 minutes with greater than 50% spent in face-to-face counseling regarding the above plan.    This note has been dictated using voice recognition software. Any grammatical or context distortions are unintentional and inherent to the the software.     Lela Randolph MD  Family Medicine [facility]      Subjective:      Jacklyn Thornton is a 88 y.o. female who presents to clinic for inpatient follow-up status post TIA.  Patient originally presented to the emergency department on December 15 for expressive aphasia and left-sided numbness with a preliminary diagnosis of a TIA secondary to anticoagulation with known A. fib.  The MRI demonstrated chronic microhemorrhage and some atrophy as well as left ICA stenosis.  Patient was transferred to [place] where she was observed for 1 day.    Patient is a poor historian and most of today's visit was spent in discussion with the 2 daughters.  Patient is doing well since her discharge.  She is working with home physical therapy and is working on balance and strengthening exercises.  Has not had any repeat symptoms similar to the stroke.  She is overall doing very well.  She is noticing some mobility concerns, but these are not new.  Patient has not fallen recently but is a significant fall risk.  Daughters are concerned about not anticoagulating the patient and about the risks of anticoagulation.    Old records reviewed:   -Discharge summary    Past Medical History, Family History, and Social History reviewed.   Social History     Tobacco Use   Smoking Status Former Smoker   Smokeless Tobacco Never Used   Tobacco Comment    \"quit 50 yrs ago\"       Review of systems is as stated in HPI.  Patient endorses: joint pain, arthritis  The remainder of the 10 system review is otherwise negative.    Objective:     BP (!) 124/94   Pulse 76   Ht 4' 11\" (1.499 m)   Wt 135 lb (61.2 kg)   SpO2 (!) 87%   BMI 27.27 kg/m   Body mass index " is 27.27 kg/m .    Gen: Alert, NAD, appears stated age, normal hygiene   Eyes: conjunctivae without injection, sclera clear, EOMI  ENT/mouth: nares clear, septum midline, absent rhinorrhea, pharyngeal injection absent, neck is supple, no thyroid enlargement  CV: RRR, no murmur appreciated, pedal edema absent bilaterally  Resp: CTAB, no wheezes, rales or ronchi  ABD: normoactive, non-tender to palpation, nondistended  MSK: grossly full range of motion in all joints, no obvious deformity  Neuro: Cranial nerves II through XII intact aside from cranial nerve VIII, patient is known to have difficulty hearing.  Patient has no loss of sensation or strength, and appears equal bilaterally, absence of dysdiadochokinesia.  However, patient was unable to ambulate and then sit on my exam table.  She appeared very unsteady on her feet.  Psych: no apparent hallucinations or delusions, no pressured speech; alert, oriented x3  SKIN: dry and without lesions  Heme/lymph: no pallor, no active bleeding/bruising, no adenopathy appreciated      Current Outpatient Medications on File Prior to Visit   Medication Sig Dispense Refill     cholecalciferol, vitamin D3, 1,000 unit tablet Take 1,000 Units by mouth daily.       clopidogrel (PLAVIX) 75 mg tablet TAKE 1 TABLET BY MOUTH EVERY DAY 90 tablet 3     FLUoxetine (PROZAC) 40 MG capsule TAKE 1 CAPSULE BY MOUTH EVERY DAY 90 capsule 1     isosorbide mononitrate (IMDUR) 30 MG 24 hr tablet Take 1 tablet (30 mg total) by mouth 2 (two) times a day. 180 tablet 3     levETIRAcetam (KEPPRA) 500 MG tablet TAKE 1 TABLET BY MOUTH TWICE A  tablet 3     meloxicam (MOBIC) 7.5 MG tablet Take 7.5 mg by mouth daily.       nitroglycerin (NITROSTAT) 0.4 MG SL tablet Place 1 tablet (0.4 mg total) under the tongue every 5 (five) minutes as needed for chest pain. 20 tablet 0     omeprazole (PRILOSEC) 20 MG capsule TAKE 1 CAPSULE BY MOUTH EVERY DAY 90 capsule 1     simvastatin (ZOCOR) 20 MG tablet TAKE 1  TABLET BY MOUTH EVERYDAY AT BEDTIME 90 tablet 2     sotalol (BETAPACE) 80 MG tablet Take 40 mg by mouth 2 (two) times a day.       spironolactone (ALDACTONE) 25 MG tablet Take 1 tablet (25 mg total) by mouth daily. 90 tablet 1     No current facility-administered medications on file prior to visit.

## 2021-06-02 NOTE — TELEPHONE ENCOUNTER
Medication Request  Medication name: Meloxicam 7.5 mg tablet, one tablet by mouth daily  Pharmacy Name and Location: [id number]  Reason for request: Request came by fax from pharmacy  When did you use medication last?:  Unknown, fax came from pharmacy  Patient offered appointment:  n/a  Okay to leave a detailed message: no

## 2021-06-02 NOTE — TELEPHONE ENCOUNTER
Orders being requested:   Physical Therapy  2x a week for 3 weeks    Reason service is needed/diagnosis:   Fall Risk  Weakness    Strengthening  Gait Training  Balance    When are orders needed by: Asap  Where to send Orders: Phone:  Verbal orders to: Jonatan with [facility], [phone]  Okay to leave detailed message?  Yes

## 2021-06-02 NOTE — TELEPHONE ENCOUNTER
[facility] - Status Update  Who is Calling: [facility]  Update: Patient is NOT taking these medications, Prozac 10 mg, Hydrocodone 5/325, Beta pace  120 mg ,   Trazodone 50 mg   Patient is taking -  Prozac  40 mg , 1 tablet daily  Sultalol Hcl as -   80 mg, take 1/2 tablet twice daily  Tylenol 500 mg, 1-2 tablets every 6 hours as ne  Okay to leave a detailed message?:  Yes

## 2021-06-02 NOTE — TELEPHONE ENCOUNTER
Called and notified Lupesa Dr. Randolph has reviewed and is ok with these medication adjustments.

## 2021-06-02 NOTE — TELEPHONE ENCOUNTER
Orders being requested: Home Care Occupational Therapy     1 time a week for 5 weeks  Reason service is needed/diagnosis: to work on home safety, walker management and ADL's training.   When are orders needed by: ASAP  Where to send Orders: Phone:  verbal orders   Okay to leave detailed message?  Yes  Caller asked if calling verbal orders to leave complete name and spelling with position title on message.

## 2021-06-02 NOTE — TELEPHONE ENCOUNTER
Left message to call back for: orders request  Information to relay to patient:  Please notify ok for requested order per Dr. Randolph.     Did not leave a voice mail as there was not a detailed voicemail greeting to indicate who I am calling.

## 2021-06-02 NOTE — TELEPHONE ENCOUNTER
Orders being requested: Skilled nursing  2 x week x 2   1 x week x 4   2 PRN   Reason service is needed/diagnosis:  Weakness, pain, mediation management  When are orders needed by: Today  Where to send Orders: Phone:  [facility]  [phone]  Okay to leave detailed message?  Yes      Orders being requested:  PT & OT  Eval &Ttreat   Reason service is needed/diagnosis: TIA  When are orders needed by: Today  Where to send Orders: Phone:  [facility]  [phone]  Okay to leave detailed message?  Yes    Orders being requested: Home health aide  2 x week x 9   Reason service is needed/diagnosis:  TIA, help with daily chores,cleaning, dressing patient  When are orders needed by: Today  Where to send Orders: Phone:  [phone]  Okay to leave detailed message?  Yes

## 2021-06-03 VITALS
BODY MASS INDEX: 27.21 KG/M2 | WEIGHT: 135 LBS | DIASTOLIC BLOOD PRESSURE: 94 MMHG | HEIGHT: 59 IN | OXYGEN SATURATION: 87 % | SYSTOLIC BLOOD PRESSURE: 124 MMHG | HEART RATE: 76 BPM

## 2021-06-03 NOTE — TELEPHONE ENCOUNTER
Reason contacted:  Orders request  Information relayed:  Notified ok for order per Dr. Randolph.   Additional questions:  No  Further follow-up needed:  No  Okay to leave a detailed message:  No

## 2021-06-03 NOTE — TELEPHONE ENCOUNTER
Orders being requested:  Discharge from home health aide early    Reason service is needed/diagnosis: Heart disease    When are orders needed by: ASAP    Where to send Orders: Phone:  [phone]     Okay to leave detailed message?  Yes

## 2021-06-04 VITALS
WEIGHT: 134 LBS | HEART RATE: 76 BPM | RESPIRATION RATE: 14 BRPM | HEIGHT: 59 IN | SYSTOLIC BLOOD PRESSURE: 134 MMHG | BODY MASS INDEX: 27.01 KG/M2 | DIASTOLIC BLOOD PRESSURE: 80 MMHG

## 2021-06-04 NOTE — TELEPHONE ENCOUNTER
Triage call:     Fall in her apartment - pressed call button - happened yesterday   Pain in her upper abdomen after falling - at one time was severe. Patient told daughter that only time she was not having pain was when she was sitting still.   Daughter states that she is unsure of how she landed but that she thinks she might have caught herself with her arms since patient has a bandage on her arm.     Daughter was also told that BP was elevated ???/110- daughter only remembered the bottom number.     Triaged to be seen in the ER at this time. Daughter agrees to bring her in at this time to be evaluated will go to  ER.     Haylie Garcia RN [facility] Triage/Med Refill 12/20/2019 1:44 PM    Reason for Disposition    Pain lasting > 10 minutes and over 50 years old    Protocols used: ABDOMINAL PAIN - UPPER-A-OH

## 2021-06-05 NOTE — TELEPHONE ENCOUNTER
RN cannot approve Refill Request    RN can NOT refill this medication med is not covered by policy/route to provider, Protocol failed and NO refill given and historical medication requested.       Linn Jensen, [facility] Triage/Med Refill 1/13/2020    Requested Prescriptions   Pending Prescriptions Disp Refills     clopidogrel (PLAVIX) 75 mg tablet [Pharmacy Med Name: CLOPIDOGREL 75MG TAB] 30 tablet 11     Sig: TAKE 1 TABLET BY MOUTH ONCE DAILY       Clopidogrel/Prasugrel/Ticagrelor Refill Protocol Passed - 1/10/2020  2:53 PM        Passed - PCP or prescribing provider visit in past 6 months       Last office visit with prescriber/PCP: 10/24/2019 OR same dept: 10/24/2019 Lela Randolph MD OR same specialty: 10/24/2019 Lela Randolph MD Last physical: Visit date not found Last MTM visit: Visit date not found     Next appt within 3 mo: Visit date not found  Next physical within 3 mo: Visit date not found  Prescriber OR PCP: Lela Randolph MD  Last diagnosis associated with med order: 1. Paroxysmal atrial fibrillation (H)  - ELIQUIS 5 mg Tab tablet [Pharmacy Med Name: ELIQUIS 5MG TAB]; TAKE 1 TABLET BY MOUTH TWO TIMES A DAY  Dispense: 60 tablet; Refill: 11    2. Mild episode of recurrent major depressive disorder (H)  - FLUoxetine (PROZAC) 40 MG capsule; TAKE 1 CAPSULE BY MOUTH ONCE DAILY  Dispense: 30 capsule; Refill: 11    3. CAD (coronary artery disease)  - isosorbide mononitrate (IMDUR) 30 MG 24 hr tablet; TAKE 1 TABLET BY MOUTH TWO TIMES A DAY  Dispense: 60 tablet; Refill: 11    4. Partial symptomatic epilepsy with complex partial seizures, not intractable, without status epilepticus (H)  - levETIRAcetam (KEPPRA) 500 MG tablet; TAKE 1 TABLET BY MOUTH TWO TIMES A DAY  Dispense: 60 tablet; Refill: 11    5. Back pain, unspecified back location, unspecified back pain laterality, unspecified chronicity  - meloxicam (MOBIC) 7.5 MG tablet [Pharmacy Med Name: MELOXICAM 7.5MG TAB]; TAKE 1 TABLET  BY MOUTH ONCE DAILY  Dispense: 30 tablet; Refill: 11    6. GERD (gastroesophageal reflux disease)  - omeprazole (PRILOSEC) 20 MG capsule; TAKE 1 CAPSULE BY MOUTH ONCE DAILY  Dispense: 30 capsule; Refill: 11    7. Hyperlipidemia  - simvastatin (ZOCOR) 20 MG tablet; TAKE 1 TABLET BY MOUTH ONCE AT BEDTIME  Dispense: 30 tablet; Refill: 11    8. Coronary atherosclerosis  - spironolactone (ALDACTONE) 25 MG tablet; TAKE 1 TABLET BY MOUTH ONCE DAILY  Dispense: 30 tablet; Refill: 11    If protocol passes may refill for 6 months if within 3 months of last provider visit (or a total of 9 months).              Passed - Hemoglobin in past 12 months     Hemoglobin   Date Value Ref Range Status   10/15/2019 16.2 (H) 12.0 - 16.0 g/dL Final             ELIQUIS 5 mg Tab tablet [Pharmacy Med Name: ELIQUIS 5MG TAB] 60 tablet 11     Sig: TAKE 1 TABLET BY MOUTH TWO TIMES A DAY       Apixaban/Rivaroxaban/Dabigatran Refill Protocol Failed - 1/10/2020  2:53 PM        Failed - Route to appropriate pool/provider     Last Anticoagulation Summary:           Passed - Renal function test in last year     Creatinine   Date Value Ref Range Status   10/15/2019 0.93 0.60 - 1.10 mg/dL Final             Passed - PCP or prescribing provider visit in past 12 months       Last office visit with prescriber/PCP: 10/24/2019 Lela Randolph MD OR same dept: 10/24/2019 Lela Randolph MD OR same specialty: 10/24/2019 Lela Randolph MD  Last physical: Visit date not found Last [facility] visit: Visit date not found   Next visit within 3 mo: Visit date not found  Next physical within 3 mo: Visit date not found  Prescriber OR PCP: Lela Randolph MD  Last diagnosis associated with med order: 1. Paroxysmal atrial fibrillation (H)  - ELIQUIS 5 mg Tab tablet [Pharmacy Med Name: ELIQUIS 5MG TAB]; TAKE 1 TABLET BY MOUTH TWO TIMES A DAY  Dispense: 60 tablet; Refill: 11    2. Mild episode of recurrent major depressive disorder (H)  - FLUoxetine (PROZAC)  40 MG capsule; TAKE 1 CAPSULE BY MOUTH ONCE DAILY  Dispense: 30 capsule; Refill: 11    3. CAD (coronary artery disease)  - isosorbide mononitrate (IMDUR) 30 MG 24 hr tablet; TAKE 1 TABLET BY MOUTH TWO TIMES A DAY  Dispense: 60 tablet; Refill: 11    4. Partial symptomatic epilepsy with complex partial seizures, not intractable, without status epilepticus (H)  - levETIRAcetam (KEPPRA) 500 MG tablet; TAKE 1 TABLET BY MOUTH TWO TIMES A DAY  Dispense: 60 tablet; Refill: 11    5. Back pain, unspecified back location, unspecified back pain laterality, unspecified chronicity  - meloxicam (MOBIC) 7.5 MG tablet [Pharmacy Med Name: MELOXICAM 7.5MG TAB]; TAKE 1 TABLET BY MOUTH ONCE DAILY  Dispense: 30 tablet; Refill: 11    6. GERD (gastroesophageal reflux disease)  - omeprazole (PRILOSEC) 20 MG capsule; TAKE 1 CAPSULE BY MOUTH ONCE DAILY  Dispense: 30 capsule; Refill: 11    7. Hyperlipidemia  - simvastatin (ZOCOR) 20 MG tablet; TAKE 1 TABLET BY MOUTH ONCE AT BEDTIME  Dispense: 30 tablet; Refill: 11    8. Coronary atherosclerosis  - spironolactone (ALDACTONE) 25 MG tablet; TAKE 1 TABLET BY MOUTH ONCE DAILY  Dispense: 30 tablet; Refill: 11    If protocol passes may refill for 12 months if within 3 months of last provider visit (or a total of 15 months).             meloxicam (MOBIC) 7.5 MG tablet [Pharmacy Med Name: MELOXICAM 7.5MG TAB] 30 tablet 11     Sig: TAKE 1 TABLET BY MOUTH ONCE DAILY       There is no refill protocol information for this order        nitroglycerin (NITROSTAT) 0.4 MG SL tablet [Pharmacy Med Name: NITROGLYCERIN 0.4MG TAB SL] 25 tablet 11     Sig: TAKE 1 TABLET SUBLINGUALLY AS NEEDED, MAY REPEAT EVERY 5 MIN FOR 3 DOSES       There is no refill protocol information for this order        CHOLECALCIFEROL 25 mcg (1,000 unit) tablet [Pharmacy Med Name: VITAMIN D 1,000IU TAB] 30 tablet 11     Sig: TAKE 1 TABLET BY MOUTH ONCE DAILY       There is no refill protocol information for this order      Signed  Prescriptions Disp Refills    FLUoxetine (PROZAC) 40 MG capsule 30 capsule 11     Sig: TAKE 1 CAPSULE BY MOUTH ONCE DAILY       SSRI Refill Protocol  Passed - 1/10/2020  2:53 PM        Passed - PCP or prescribing provider visit in last year     Last office visit with prescriber/PCP: 10/24/2019 Lela Randolph MD OR same dept: 10/24/2019 Lela Randolph MD OR same specialty: 10/24/2019 Lela Randolph MD  Last physical: Visit date not found Last MTM visit: Visit date not found   Next visit within 3 mo: Visit date not found  Next physical within 3 mo: Visit date not found  Prescriber OR PCP: Lela Randolph MD  Last diagnosis associated with med order: 1. Paroxysmal atrial fibrillation (H)  - ELIQUIS 5 mg Tab tablet [Pharmacy Med Name: ELIQUIS 5MG TAB]; TAKE 1 TABLET BY MOUTH TWO TIMES A DAY  Dispense: 60 tablet; Refill: 11    2. Mild episode of recurrent major depressive disorder (H)  - FLUoxetine (PROZAC) 40 MG capsule; TAKE 1 CAPSULE BY MOUTH ONCE DAILY  Dispense: 30 capsule; Refill: 11    3. CAD (coronary artery disease)  - isosorbide mononitrate (IMDUR) 30 MG 24 hr tablet; TAKE 1 TABLET BY MOUTH TWO TIMES A DAY  Dispense: 60 tablet; Refill: 11    4. Partial symptomatic epilepsy with complex partial seizures, not intractable, without status epilepticus (H)  - levETIRAcetam (KEPPRA) 500 MG tablet; TAKE 1 TABLET BY MOUTH TWO TIMES A DAY  Dispense: 60 tablet; Refill: 11    5. Back pain, unspecified back location, unspecified back pain laterality, unspecified chronicity  - meloxicam (MOBIC) 7.5 MG tablet [Pharmacy Med Name: MELOXICAM 7.5MG TAB]; TAKE 1 TABLET BY MOUTH ONCE DAILY  Dispense: 30 tablet; Refill: 11    6. GERD (gastroesophageal reflux disease)  - omeprazole (PRILOSEC) 20 MG capsule; TAKE 1 CAPSULE BY MOUTH ONCE DAILY  Dispense: 30 capsule; Refill: 11    7. Hyperlipidemia  - simvastatin (ZOCOR) 20 MG tablet; TAKE 1 TABLET BY MOUTH ONCE AT BEDTIME  Dispense: 30 tablet; Refill: 11    8.  Coronary atherosclerosis  - spironolactone (ALDACTONE) 25 MG tablet; TAKE 1 TABLET BY MOUTH ONCE DAILY  Dispense: 30 tablet; Refill: 11    If protocol passes may refill for 12 months if within 3 months of last provider visit (or a total of 15 months).            isosorbide mononitrate (IMDUR) 30 MG 24 hr tablet 60 tablet 11     Sig: TAKE 1 TABLET BY MOUTH TWO TIMES A DAY       Isosorbide Refill Protocol Passed - 1/10/2020  2:53 PM        Passed - Visit with PCP or prescribing provider visit in last 6 months or next 3 months     Last office visit with prescriber/PCP: 10/24/2019 OR same dept: 10/24/2019 Lela Randolph MD OR same specialty: 10/24/2019 Lela Randolph MD Last physical: Visit date not found Last MTM visit: Visit date not found     Next appt within 3 mo: Visit date not found  Next physical within 3 mo: Visit date not found  Prescriber OR PCP: Lela Randolph MD  Last diagnosis associated with med order: 1. Paroxysmal atrial fibrillation (H)  - ELIQUIS 5 mg Tab tablet [Pharmacy Med Name: ELIQUIS 5MG TAB]; TAKE 1 TABLET BY MOUTH TWO TIMES A DAY  Dispense: 60 tablet; Refill: 11    2. Mild episode of recurrent major depressive disorder (H)  - FLUoxetine (PROZAC) 40 MG capsule; TAKE 1 CAPSULE BY MOUTH ONCE DAILY  Dispense: 30 capsule; Refill: 11    3. CAD (coronary artery disease)  - isosorbide mononitrate (IMDUR) 30 MG 24 hr tablet; TAKE 1 TABLET BY MOUTH TWO TIMES A DAY  Dispense: 60 tablet; Refill: 11    4. Partial symptomatic epilepsy with complex partial seizures, not intractable, without status epilepticus (H)  - levETIRAcetam (KEPPRA) 500 MG tablet; TAKE 1 TABLET BY MOUTH TWO TIMES A DAY  Dispense: 60 tablet; Refill: 11    5. Back pain, unspecified back location, unspecified back pain laterality, unspecified chronicity  - meloxicam (MOBIC) 7.5 MG tablet [Pharmacy Med Name: MELOXICAM 7.5MG TAB]; TAKE 1 TABLET BY MOUTH ONCE DAILY  Dispense: 30 tablet; Refill: 11    6. GERD  (gastroesophageal reflux disease)  - omeprazole (PRILOSEC) 20 MG capsule; TAKE 1 CAPSULE BY MOUTH ONCE DAILY  Dispense: 30 capsule; Refill: 11    7. Hyperlipidemia  - simvastatin (ZOCOR) 20 MG tablet; TAKE 1 TABLET BY MOUTH ONCE AT BEDTIME  Dispense: 30 tablet; Refill: 11    8. Coronary atherosclerosis  - spironolactone (ALDACTONE) 25 MG tablet; TAKE 1 TABLET BY MOUTH ONCE DAILY  Dispense: 30 tablet; Refill: 11    If protocol passes may refill for 6 months if within 3 months of last provider visit (or a total of 9 months).              Passed - Blood pressure filed in past 12 months     BP Readings from Last 1 Encounters:   12/20/19 122/81            levETIRAcetam (KEPPRA) 500 MG tablet 60 tablet 11     Sig: TAKE 1 TABLET BY MOUTH TWO TIMES A DAY       Gabapentin/Levetiracetam/Tiagabine Refill Protocol  Passed - 1/10/2020  2:53 PM        Passed - PCP or prescribing provider visit in past 12 months or next 3 months     Last office visit with prescriber/PCP: 10/24/2019 Lela Randolph MD OR same dept: 10/24/2019 Lela Randolph MD OR same specialty: 10/24/2019 Lela Randolph MD  Last physical: Visit date not found Last MTM visit: Visit date not found   Next visit within 3 mo: Visit date not found  Next physical within 3 mo: Visit date not found  Prescriber OR PCP: Lela Randolph MD  Last diagnosis associated with med order: 1. Paroxysmal atrial fibrillation (H)  - ELIQUIS 5 mg Tab tablet [Pharmacy Med Name: ELIQUIS 5MG TAB]; TAKE 1 TABLET BY MOUTH TWO TIMES A DAY  Dispense: 60 tablet; Refill: 11    2. Mild episode of recurrent major depressive disorder (H)  - FLUoxetine (PROZAC) 40 MG capsule; TAKE 1 CAPSULE BY MOUTH ONCE DAILY  Dispense: 30 capsule; Refill: 11    3. CAD (coronary artery disease)  - isosorbide mononitrate (IMDUR) 30 MG 24 hr tablet; TAKE 1 TABLET BY MOUTH TWO TIMES A DAY  Dispense: 60 tablet; Refill: 11    4. Partial symptomatic epilepsy with complex partial seizures, not  intractable, without status epilepticus (H)  - levETIRAcetam (KEPPRA) 500 MG tablet; TAKE 1 TABLET BY MOUTH TWO TIMES A DAY  Dispense: 60 tablet; Refill: 11    5. Back pain, unspecified back location, unspecified back pain laterality, unspecified chronicity  - meloxicam (MOBIC) 7.5 MG tablet [Pharmacy Med Name: MELOXICAM 7.5MG TAB]; TAKE 1 TABLET BY MOUTH ONCE DAILY  Dispense: 30 tablet; Refill: 11    6. GERD (gastroesophageal reflux disease)  - omeprazole (PRILOSEC) 20 MG capsule; TAKE 1 CAPSULE BY MOUTH ONCE DAILY  Dispense: 30 capsule; Refill: 11    7. Hyperlipidemia  - simvastatin (ZOCOR) 20 MG tablet; TAKE 1 TABLET BY MOUTH ONCE AT BEDTIME  Dispense: 30 tablet; Refill: 11    8. Coronary atherosclerosis  - spironolactone (ALDACTONE) 25 MG tablet; TAKE 1 TABLET BY MOUTH ONCE DAILY  Dispense: 30 tablet; Refill: 11    If protocol passes may refill for 12 months if within 3 months of last provider visit (or a total of 15 months).            omeprazole (PRILOSEC) 20 MG capsule 30 capsule 11     Sig: TAKE 1 CAPSULE BY MOUTH ONCE DAILY       GI Medications Refill Protocol Passed - 1/10/2020  2:53 PM        Passed - PCP or prescribing provider visit in last 12 or next 3 months.     Last office visit with prescriber/PCP: 10/24/2019 Lela Randolph MD OR same dept: 10/24/2019 Lela Randolph MD OR same specialty: 10/24/2019 Lela Randolph MD  Last physical: Visit date not found Last [facility] visit: Visit date not found   Next visit within 3 mo: Visit date not found  Next physical within 3 mo: Visit date not found  Prescriber OR PCP: Lela Randolph MD  Last diagnosis associated with med order: 1. Paroxysmal atrial fibrillation (H)  - ELIQUIS 5 mg Tab tablet [Pharmacy Med Name: ELIQUIS 5MG TAB]; TAKE 1 TABLET BY MOUTH TWO TIMES A DAY  Dispense: 60 tablet; Refill: 11    2. Mild episode of recurrent major depressive disorder (H)  - FLUoxetine (PROZAC) 40 MG capsule; TAKE 1 CAPSULE BY MOUTH ONCE DAILY   Dispense: 30 capsule; Refill: 11    3. CAD (coronary artery disease)  - isosorbide mononitrate (IMDUR) 30 MG 24 hr tablet; TAKE 1 TABLET BY MOUTH TWO TIMES A DAY  Dispense: 60 tablet; Refill: 11    4. Partial symptomatic epilepsy with complex partial seizures, not intractable, without status epilepticus (H)  - levETIRAcetam (KEPPRA) 500 MG tablet; TAKE 1 TABLET BY MOUTH TWO TIMES A DAY  Dispense: 60 tablet; Refill: 11    5. Back pain, unspecified back location, unspecified back pain laterality, unspecified chronicity  - meloxicam (MOBIC) 7.5 MG tablet [Pharmacy Med Name: MELOXICAM 7.5MG TAB]; TAKE 1 TABLET BY MOUTH ONCE DAILY  Dispense: 30 tablet; Refill: 11    6. GERD (gastroesophageal reflux disease)  - omeprazole (PRILOSEC) 20 MG capsule; TAKE 1 CAPSULE BY MOUTH ONCE DAILY  Dispense: 30 capsule; Refill: 11    7. Hyperlipidemia  - simvastatin (ZOCOR) 20 MG tablet; TAKE 1 TABLET BY MOUTH ONCE AT BEDTIME  Dispense: 30 tablet; Refill: 11    8. Coronary atherosclerosis  - spironolactone (ALDACTONE) 25 MG tablet; TAKE 1 TABLET BY MOUTH ONCE DAILY  Dispense: 30 tablet; Refill: 11    If protocol passes may refill for 12 months if within 3 months of last provider visit (or a total of 15 months).            simvastatin (ZOCOR) 20 MG tablet 30 tablet 11     Sig: TAKE 1 TABLET BY MOUTH ONCE AT BEDTIME       Statins Refill Protocol (Hmg CoA Reductase Inhibitors) Passed - 1/10/2020  2:53 PM        Passed - PCP or prescribing provider visit in past 12 months      Last office visit with prescriber/PCP: 10/24/2019 Lela Randolph MD OR same dept: 10/24/2019 Lela Randolph MD OR same specialty: 10/24/2019 Lela Randolph MD  Last physical: Visit date not found Last MTM visit: Visit date not found   Next visit within 3 mo: Visit date not found  Next physical within 3 mo: Visit date not found  Prescriber OR PCP: Lela Randolph MD  Last diagnosis associated with med order: 1. Paroxysmal atrial fibrillation  (H)  - ELIQUIS 5 mg Tab tablet [Pharmacy Med Name: ELIQUIS 5MG TAB]; TAKE 1 TABLET BY MOUTH TWO TIMES A DAY  Dispense: 60 tablet; Refill: 11    2. Mild episode of recurrent major depressive disorder (H)  - FLUoxetine (PROZAC) 40 MG capsule; TAKE 1 CAPSULE BY MOUTH ONCE DAILY  Dispense: 30 capsule; Refill: 11    3. CAD (coronary artery disease)  - isosorbide mononitrate (IMDUR) 30 MG 24 hr tablet; TAKE 1 TABLET BY MOUTH TWO TIMES A DAY  Dispense: 60 tablet; Refill: 11    4. Partial symptomatic epilepsy with complex partial seizures, not intractable, without status epilepticus (H)  - levETIRAcetam (KEPPRA) 500 MG tablet; TAKE 1 TABLET BY MOUTH TWO TIMES A DAY  Dispense: 60 tablet; Refill: 11    5. Back pain, unspecified back location, unspecified back pain laterality, unspecified chronicity  - meloxicam (MOBIC) 7.5 MG tablet [Pharmacy Med Name: MELOXICAM 7.5MG TAB]; TAKE 1 TABLET BY MOUTH ONCE DAILY  Dispense: 30 tablet; Refill: 11    6. GERD (gastroesophageal reflux disease)  - omeprazole (PRILOSEC) 20 MG capsule; TAKE 1 CAPSULE BY MOUTH ONCE DAILY  Dispense: 30 capsule; Refill: 11    7. Hyperlipidemia  - simvastatin (ZOCOR) 20 MG tablet; TAKE 1 TABLET BY MOUTH ONCE AT BEDTIME  Dispense: 30 tablet; Refill: 11    8. Coronary atherosclerosis  - spironolactone (ALDACTONE) 25 MG tablet; TAKE 1 TABLET BY MOUTH ONCE DAILY  Dispense: 30 tablet; Refill: 11    If protocol passes may refill for 12 months if within 3 months of last provider visit (or a total of 15 months).            spironolactone (ALDACTONE) 25 MG tablet 30 tablet 11     Sig: TAKE 1 TABLET BY MOUTH ONCE DAILY       Diuretics/Combination Diuretics Refill Protocol  Passed - 1/10/2020  2:53 PM        Passed - Visit with PCP or prescribing provider visit in past 12 months     Last office visit with prescriber/PCP: 10/24/2019 Lela Randolph MD OR same dept: 10/24/2019 Lela Randolph MD OR same specialty: 10/24/2019 Lela Randolph MD  Last  physical: Visit date not found Last MTM visit: Visit date not found   Next visit within 3 mo: Visit date not found  Next physical within 3 mo: Visit date not found  Prescriber OR PCP: Lela Randolph MD  Last diagnosis associated with med order: 1. Paroxysmal atrial fibrillation (H)  - ELIQUIS 5 mg Tab tablet [Pharmacy Med Name: ELIQUIS 5MG TAB]; TAKE 1 TABLET BY MOUTH TWO TIMES A DAY  Dispense: 60 tablet; Refill: 11    2. Mild episode of recurrent major depressive disorder (H)  - FLUoxetine (PROZAC) 40 MG capsule; TAKE 1 CAPSULE BY MOUTH ONCE DAILY  Dispense: 30 capsule; Refill: 11    3. CAD (coronary artery disease)  - isosorbide mononitrate (IMDUR) 30 MG 24 hr tablet; TAKE 1 TABLET BY MOUTH TWO TIMES A DAY  Dispense: 60 tablet; Refill: 11    4. Partial symptomatic epilepsy with complex partial seizures, not intractable, without status epilepticus (H)  - levETIRAcetam (KEPPRA) 500 MG tablet; TAKE 1 TABLET BY MOUTH TWO TIMES A DAY  Dispense: 60 tablet; Refill: 11    5. Back pain, unspecified back location, unspecified back pain laterality, unspecified chronicity  - meloxicam (MOBIC) 7.5 MG tablet [Pharmacy Med Name: MELOXICAM 7.5MG TAB]; TAKE 1 TABLET BY MOUTH ONCE DAILY  Dispense: 30 tablet; Refill: 11    6. GERD (gastroesophageal reflux disease)  - omeprazole (PRILOSEC) 20 MG capsule; TAKE 1 CAPSULE BY MOUTH ONCE DAILY  Dispense: 30 capsule; Refill: 11    7. Hyperlipidemia  - simvastatin (ZOCOR) 20 MG tablet; TAKE 1 TABLET BY MOUTH ONCE AT BEDTIME  Dispense: 30 tablet; Refill: 11    8. Coronary atherosclerosis  - spironolactone (ALDACTONE) 25 MG tablet; TAKE 1 TABLET BY MOUTH ONCE DAILY  Dispense: 30 tablet; Refill: 11    If protocol passes may refill for 12 months if within 3 months of last provider visit (or a total of 15 months).             Passed - Serum Potassium in past 12 months      Lab Results   Component Value Date    Potassium 5.2 (H) 10/15/2019             Passed - Serum Sodium in past 12 months       Lab Results   Component Value Date    Sodium 138 10/15/2019             Passed - Blood pressure on file in past 12 months     BP Readings from Last 1 Encounters:   12/20/19 122/81             Passed - Serum Creatinine in past 12 months      Creatinine   Date Value Ref Range Status   10/15/2019 0.93 0.60 - 1.10 mg/dL Final

## 2021-06-05 NOTE — TELEPHONE ENCOUNTER
The concurrent use of an SSRI and an NSAID can increase the risk of bleeding.  However, patient is already on Eliquis, and was previously on Eliquis and Plavix simultaneously.  I suspect the risk of bleeding is significantly higher from these medications.  I defer to cardiology on the use of Plavix concurrently with Eliquis.  Otherwise, I see no concerns with patient's current medication list.

## 2021-06-05 NOTE — TELEPHONE ENCOUNTER
Name of form/paperwork:   Other:  Admission orders for assisted living     Date form received by clinic:  01/09/20    When is the form needed by? STAT    Patient Notified form requests are processed in 3-5 business days: Yes  (If patient needs for sooner, please note that in this message)    Okay to leave a detailed message: Yes   CMA, unable to find documentation that forms were faxed.    FAX: [phone]    Please expedite faxed orders, patient is suppose to move in TODAY 1/10/20

## 2021-06-05 NOTE — TELEPHONE ENCOUNTER
I only found a drug interaction between fluoxetine and meloxicam:   Concurrent use of NSAID and SSRI may result in an increased risk of bleeding.    - ok to continue medications despite drug interaction?     Per my review Plavix was discontinued and patient is currently taking Eliquis.        They are requesting an order is faxed on whether she can continue medications despite drug interactions and whether she should be on plavix or eliquis.

## 2021-06-05 NOTE — TELEPHONE ENCOUNTER
"Patient Returning Call  Reason for call:  Medication   Information relayed to patient:  \"Ok to continue current medications; even being on plavix and elequis is ok with me if cardiology recommends this. But if they discontinued it, I'm not worried about the other interactions.\"  Patient has additional questions:  Yes  If YES, what are your questions/concerns:  Caller is requesting that Dr Randolph's recommendations be faxed to them at [phone]. Caller will speak with cardiology to determine if the patient is suppose to be taking Plavix at this time.  Okay to leave a detailed message?: No call back needed      "

## 2021-06-05 NOTE — TELEPHONE ENCOUNTER
Refill Approved    Rx renewed per Medication Renewal Policy. Medication was last renewed on 1/13/2020.    Hanna Talavera, [facility] Triage/Med Refill 1/25/2020     Requested Prescriptions   Pending Prescriptions Disp Refills     isosorbide mononitrate (IMDUR) 30 MG 24 hr tablet [Pharmacy Med Name: ISOSORBIDE MONONIT ER 30 MG TB] 180 tablet 3     Sig: TAKE 1 TABLET BY MOUTH TWICE A DAY       Isosorbide Refill Protocol Passed - 1/25/2020 12:53 AM        Passed - Visit with PCP or prescribing provider visit in last 6 months or next 3 months     Last office visit with prescriber/PCP: 10/24/2019 OR same dept: 10/24/2019 Lela Randolph MD OR same specialty: 10/24/2019 Lela Randolph MD Last physical: Visit date not found Last MTM visit: Visit date not found     Next appt within 3 mo: Visit date not found  Next physical within 3 mo: Visit date not found  Prescriber OR PCP: Lela Randolph MD  Last diagnosis associated with med order: 1. CAD (coronary artery disease)  - isosorbide mononitrate (IMDUR) 30 MG 24 hr tablet [Pharmacy Med Name: ISOSORBIDE MONONIT ER 30 MG TB]; TAKE 1 TABLET BY MOUTH TWICE A DAY  Dispense: 180 tablet; Refill: 3    If protocol passes may refill for 6 months if within 3 months of last provider visit (or a total of 9 months).              Passed - Blood pressure filed in past 12 months     BP Readings from Last 1 Encounters:   12/20/19 122/81

## 2021-06-05 NOTE — TELEPHONE ENCOUNTER
Orders being requested:   1. Signed Medication list      Reason service is needed/diagnosis: Patient will be re-locating to new living facility this Friday, Please have PCP sign each page of medication list if there is more than 1 . Please expedite as well.    When are orders needed by: STAT    Where to send Orders:   ATT: [facility]   Fax #   [phone]    Okay to leave detailed message?  Yes  [phone]       Problem: PAIN - ADULT  Goal: Verbalizes/displays adequate comfort level or patient's stated pain goal  Description  INTERVENTIONS:  - Encourage pt to monitor pain and request assistance  - Assess pain using appropriate pain scale  - Administer analgesics perspectives and choices  Outcome: Completed     Problem: POSTPARTUM  Goal: Long Term Goal:Experiences normal postpartum course  Description  INTERVENTIONS:  - Assess and monitor vital signs and lab values. - Assess fundus and lochia.   - Provide ice/sitz feeding.  - Assess and monitor for signs of nipple pain/trauma. - Instruct and provide assistance with proper latch. - Review techniques for milk expression (breast pumping) and storage of breast milk.  Provide pumping equipment/supplies, instructions and

## 2021-06-05 NOTE — TELEPHONE ENCOUNTER
Medication Question or Clarification    Who is calling: [facility]    What medication are you calling about (include dose and sig)?:     apixaban (ELIQUIS) 5 mg Tab tablet 180 tablet 3 11/22/2019     Sig - Route: Take by mouth 2 (two) times a day.      clopidogrel (PLAVIX) 75 mg tablet need directions, if therapy continues.    Same or similar med, can't be on both Eliquis and plavix. If continue therapy of plavix need directions.    Drug interaction to the med listed above:    FLUoxetine (PROZAC) 40 MG capsule  omeprazole (PRILOSEC) 20 MG capsule  meloxicam (MOBIC) 7.5 MG tablet    Please advise via fax with med change and interactions.    Fax fax order clarification:  Fax: [phone]    Who prescribed the medication?: PCP    What is your question/concern?:   Similar meds dispensed as well as possible med interaction and clarification of dosing.    Requested Pharmacy: [facility] [address] GHULAM OLIVIER [id number]  Okay to leave a detailed message?: Yes

## 2021-06-05 NOTE — TELEPHONE ENCOUNTER
----- Message from Janelle Charles sent at 1/13/2020 10:10 AM CST -----  Regarding: Discontinue Rx  The medication or refill issue is below:    Primary Cardiologist: Dr. Singh    Medication: Lasix    Issue / Concern: Mil[facility] states [facility] has discontinued Plavix but they needs a signed order to discontinue Rx, please fax to [phone] or call Milka at [phone], ok to [facility].    Additional Info: [facility]ka Hicks calling.

## 2021-06-05 NOTE — TELEPHONE ENCOUNTER
Orders being requested: Physician orders.  DC HHA  DC OT    Reason service is needed/diagnosis:   Transient ischemic attack (TIA)    When are orders needed by: STAT    Where to send Orders:  Fax: 1 [phone]     Okay to leave detailed message?  Yes    Lisa at [facility] states forms need to be signed and fax ASAP due to delay.    She states it was requested on forms to be faxed back and is out of medicare compliance not to have a fax copy on file.    Verbal orders are not sufficient.    She will resend requested forms to be completed and is requesting a call when forms are received, completed and faxed back.

## 2021-06-05 NOTE — TELEPHONE ENCOUNTER
Refill Approved    Rx renewed per Medication Renewal Policy. Medication was last renewed on 7/25/19.11/24/18.3/6/19.8/15/19.4/18/19.8/4/19.    Linn Jensen, [facility] Connection Triage/Med Refill 1/13/2020     Requested Prescriptions   Pending Prescriptions Disp Refills     clopidogrel (PLAVIX) 75 mg tablet [Pharmacy Med Name: CLOPIDOGREL 75MG TAB] 30 tablet 11     Sig: TAKE 1 TABLET BY MOUTH ONCE DAILY       Clopidogrel/Prasugrel/Ticagrelor Refill Protocol Passed - 1/10/2020  2:53 PM        Passed - PCP or prescribing provider visit in past 6 months       Last office visit with prescriber/PCP: 10/24/2019 OR same dept: 10/24/2019 Lela Randolph MD OR same specialty: 10/24/2019 Lela Radnolph MD Last physical: Visit date not found Last MTM visit: Visit date not found     Next appt within 3 mo: Visit date not found  Next physical within 3 mo: Visit date not found  Prescriber OR PCP: Lela Randolph MD  Last diagnosis associated with med order: 1. Paroxysmal atrial fibrillation (H)  - ELIQUIS 5 mg Tab tablet [Pharmacy Med Name: ELIQUIS 5MG TAB]; TAKE 1 TABLET BY MOUTH TWO TIMES A DAY  Dispense: 60 tablet; Refill: 11    2. Mild episode of recurrent major depressive disorder (H)  - FLUoxetine (PROZAC) 40 MG capsule [Pharmacy Med Name: FLUOXETINE 40MG CAP]; TAKE 1 CAPSULE BY MOUTH ONCE DAILY  Dispense: 30 capsule; Refill: 11    3. CAD (coronary artery disease)  - isosorbide mononitrate (IMDUR) 30 MG 24 hr tablet [Pharmacy Med Name: ISOSORBIDE MN ER 30MG TAB]; TAKE 1 TABLET BY MOUTH TWO TIMES A DAY  Dispense: 60 tablet; Refill: 11    4. Partial symptomatic epilepsy with complex partial seizures, not intractable, without status epilepticus (H)  - levETIRAcetam (KEPPRA) 500 MG tablet [Pharmacy Med Name: LEVETIRACETAM 500MG TAB]; TAKE 1 TABLET BY MOUTH TWO TIMES A DAY  Dispense: 60 tablet; Refill: 11    5. Back pain, unspecified back location, unspecified back pain laterality, unspecified chronicity  -  meloxicam (MOBIC) 7.5 MG tablet [Pharmacy Med Name: MELOXICAM 7.5MG TAB]; TAKE 1 TABLET BY MOUTH ONCE DAILY  Dispense: 30 tablet; Refill: 11    6. GERD (gastroesophageal reflux disease)  - omeprazole (PRILOSEC) 20 MG capsule [Pharmacy Med Name: OMEPRAZOLE 20MG CAP]; TAKE 1 CAPSULE BY MOUTH ONCE DAILY  Dispense: 30 capsule; Refill: 11    7. Hyperlipidemia  - simvastatin (ZOCOR) 20 MG tablet [Pharmacy Med Name: SIMVASTATIN 20MG TAB]; TAKE 1 TABLET BY MOUTH ONCE AT BEDTIME  Dispense: 30 tablet; Refill: 11    8. Coronary atherosclerosis  - spironolactone (ALDACTONE) 25 MG tablet [Pharmacy Med Name: SPIRONOLACTONE 25MG TAB]; TAKE 1 TABLET BY MOUTH ONCE DAILY  Dispense: 30 tablet; Refill: 11    If protocol passes may refill for 6 months if within 3 months of last provider visit (or a total of 9 months).              Passed - Hemoglobin in past 12 months     Hemoglobin   Date Value Ref Range Status   10/15/2019 16.2 (H) 12.0 - 16.0 g/dL Final             ELIQUIS 5 mg Tab tablet [Pharmacy Med Name: ELIQUIS 5MG TAB] 60 tablet 11     Sig: TAKE 1 TABLET BY MOUTH TWO TIMES A DAY       Apixaban/Rivaroxaban/Dabigatran Refill Protocol Failed - 1/10/2020  2:53 PM        Failed - Route to appropriate pool/provider     Last Anticoagulation Summary:           Passed - Renal function test in last year     Creatinine   Date Value Ref Range Status   10/15/2019 0.93 0.60 - 1.10 mg/dL Final             Passed - PCP or prescribing provider visit in past 12 months       Last office visit with prescriber/PCP: 10/24/2019 Lela Randolph MD OR same dept: 10/24/2019 Lela Randolph MD OR same specialty: 10/24/2019 Lela Randolph MD  Last physical: Visit date not found Last MTM visit: Visit date not found   Next visit within 3 mo: Visit date not found  Next physical within 3 mo: Visit date not found  Prescriber OR PCP: Lela Randolph MD  Last diagnosis associated with med order: 1. Paroxysmal atrial fibrillation (H)  -  ELIQUIS 5 mg Tab tablet [Pharmacy Med Name: ELIQUIS 5MG TAB]; TAKE 1 TABLET BY MOUTH TWO TIMES A DAY  Dispense: 60 tablet; Refill: 11    2. Mild episode of recurrent major depressive disorder (H)  - FLUoxetine (PROZAC) 40 MG capsule [Pharmacy Med Name: FLUOXETINE 40MG CAP]; TAKE 1 CAPSULE BY MOUTH ONCE DAILY  Dispense: 30 capsule; Refill: 11    3. CAD (coronary artery disease)  - isosorbide mononitrate (IMDUR) 30 MG 24 hr tablet [Pharmacy Med Name: ISOSORBIDE MN ER 30MG TAB]; TAKE 1 TABLET BY MOUTH TWO TIMES A DAY  Dispense: 60 tablet; Refill: 11    4. Partial symptomatic epilepsy with complex partial seizures, not intractable, without status epilepticus (H)  - levETIRAcetam (KEPPRA) 500 MG tablet [Pharmacy Med Name: LEVETIRACETAM 500MG TAB]; TAKE 1 TABLET BY MOUTH TWO TIMES A DAY  Dispense: 60 tablet; Refill: 11    5. Back pain, unspecified back location, unspecified back pain laterality, unspecified chronicity  - meloxicam (MOBIC) 7.5 MG tablet [Pharmacy Med Name: MELOXICAM 7.5MG TAB]; TAKE 1 TABLET BY MOUTH ONCE DAILY  Dispense: 30 tablet; Refill: 11    6. GERD (gastroesophageal reflux disease)  - omeprazole (PRILOSEC) 20 MG capsule [Pharmacy Med Name: OMEPRAZOLE 20MG CAP]; TAKE 1 CAPSULE BY MOUTH ONCE DAILY  Dispense: 30 capsule; Refill: 11    7. Hyperlipidemia  - simvastatin (ZOCOR) 20 MG tablet [Pharmacy Med Name: SIMVASTATIN 20MG TAB]; TAKE 1 TABLET BY MOUTH ONCE AT BEDTIME  Dispense: 30 tablet; Refill: 11    8. Coronary atherosclerosis  - spironolactone (ALDACTONE) 25 MG tablet [Pharmacy Med Name: SPIRONOLACTONE 25MG TAB]; TAKE 1 TABLET BY MOUTH ONCE DAILY  Dispense: 30 tablet; Refill: 11    If protocol passes may refill for 12 months if within 3 months of last provider visit (or a total of 15 months).             FLUoxetine (PROZAC) 40 MG capsule [Pharmacy Med Name: FLUOXETINE 40MG CAP] 30 capsule 11     Sig: TAKE 1 CAPSULE BY MOUTH ONCE DAILY       SS[facility] Refill Protocol  Passed - 1/10/2020  2:53 PM         Passed - PCP or prescribing provider visit in last year     Last office visit with prescriber/PCP: 10/24/2019 Lela Randolph MD OR same dept: 10/24/2019 Lela Randolph MD OR same specialty: 10/24/2019 Lela Randolph MD  Last physical: Visit date not found Last MTM visit: Visit date not found   Next visit within 3 mo: Visit date not found  Next physical within 3 mo: Visit date not found  Prescriber OR PCP: Lela Randolph MD  Last diagnosis associated with med order: 1. Paroxysmal atrial fibrillation (H)  - ELIQUIS 5 mg Tab tablet [Pharmacy Med Name: ELIQUIS 5MG TAB]; TAKE 1 TABLET BY MOUTH TWO TIMES A DAY  Dispense: 60 tablet; Refill: 11    2. Mild episode of recurrent major depressive disorder (H)  - FLUoxetine (PROZAC) 40 MG capsule [Pharmacy Med Name: FLUOXETINE 40MG CAP]; TAKE 1 CAPSULE BY MOUTH ONCE DAILY  Dispense: 30 capsule; Refill: 11    3. CAD (coronary artery disease)  - isosorbide mononitrate (IMDUR) 30 MG 24 hr tablet [Pharmacy Med Name: ISOSORBIDE MN ER 30MG TAB]; TAKE 1 TABLET BY MOUTH TWO TIMES A DAY  Dispense: 60 tablet; Refill: 11    4. Partial symptomatic epilepsy with complex partial seizures, not intractable, without status epilepticus (H)  - levETIRAcetam (KEPPRA) 500 MG tablet [Pharmacy Med Name: LEVETIRACETAM 500MG TAB]; TAKE 1 TABLET BY MOUTH TWO TIMES A DAY  Dispense: 60 tablet; Refill: 11    5. Back pain, unspecified back location, unspecified back pain laterality, unspecified chronicity  - meloxicam (MOBIC) 7.5 MG tablet [Pharmacy Med Name: MELOXICAM 7.5MG TAB]; TAKE 1 TABLET BY MOUTH ONCE DAILY  Dispense: 30 tablet; Refill: 11    6. GERD (gastroesophageal reflux disease)  - omeprazole (PRILOSEC) 20 MG capsule [Pharmacy Med Name: OMEPRAZOLE 20MG CAP]; TAKE 1 CAPSULE BY MOUTH ONCE DAILY  Dispense: 30 capsule; Refill: 11    7. Hyperlipidemia  - simvastatin (ZOCOR) 20 MG tablet [Pharmacy Med Name: SIMVASTATIN 20MG TAB]; TAKE 1 TABLET BY MOUTH ONCE AT BEDTIME   Dispense: 30 tablet; Refill: 11    8. Coronary atherosclerosis  - spironolactone (ALDACTONE) 25 MG tablet [Pharmacy Med Name: SPIRONOLACTONE 25MG TAB]; TAKE 1 TABLET BY MOUTH ONCE DAILY  Dispense: 30 tablet; Refill: 11    If protocol passes may refill for 12 months if within 3 months of last provider visit (or a total of 15 months).             isosorbide mononitrate (IMDUR) 30 MG 24 hr tablet [Pharmacy Med Name: ISOSORBIDE MN ER 30MG TAB] 60 tablet 11     Sig: TAKE 1 TABLET BY MOUTH TWO TIMES A DAY       Isosorbide Refill Protocol Passed - 1/10/2020  2:53 PM        Passed - Visit with PCP or prescribing provider visit in last 6 months or next 3 months     Last office visit with prescriber/PCP: 10/24/2019 OR same dept: 10/24/2019 Lela Randolph MD OR same specialty: 10/24/2019 Lela Randolph MD Last physical: Visit date not found Last MTM visit: Visit date not found     Next appt within 3 mo: Visit date not found  Next physical within 3 mo: Visit date not found  Prescriber OR PCP: Lela Randolph MD  Last diagnosis associated with med order: 1. Paroxysmal atrial fibrillation (H)  - ELIQUIS 5 mg Tab tablet [Pharmacy Med Name: ELIQUIS 5MG TAB]; TAKE 1 TABLET BY MOUTH TWO TIMES A DAY  Dispense: 60 tablet; Refill: 11    2. Mild episode of recurrent major depressive disorder (H)  - FLUoxetine (PROZAC) 40 MG capsule [Pharmacy Med Name: FLUOXETINE 40MG CAP]; TAKE 1 CAPSULE BY MOUTH ONCE DAILY  Dispense: 30 capsule; Refill: 11    3. CAD (coronary artery disease)  - isosorbide mononitrate (IMDUR) 30 MG 24 hr tablet [Pharmacy Med Name: ISOSORBIDE MN ER 30MG TAB]; TAKE 1 TABLET BY MOUTH TWO TIMES A DAY  Dispense: 60 tablet; Refill: 11    4. Partial symptomatic epilepsy with complex partial seizures, not intractable, without status epilepticus (H)  - levETIRAcetam (KEPPRA) 500 MG tablet [Pharmacy Med Name: LEVETIRACETAM 500MG TAB]; TAKE 1 TABLET BY MOUTH TWO TIMES A DAY  Dispense: 60 tablet; Refill: 11    5.  Back pain, unspecified back location, unspecified back pain laterality, unspecified chronicity  - meloxicam (MOBIC) 7.5 MG tablet [Pharmacy Med Name: MELOXICAM 7.5MG TAB]; TAKE 1 TABLET BY MOUTH ONCE DAILY  Dispense: 30 tablet; Refill: 11    6. GERD (gastroesophageal reflux disease)  - omeprazole (PRILOSEC) 20 MG capsule [Pharmacy Med Name: OMEPRAZOLE 20MG CAP]; TAKE 1 CAPSULE BY MOUTH ONCE DAILY  Dispense: 30 capsule; Refill: 11    7. Hyperlipidemia  - simvastatin (ZOCOR) 20 MG tablet [Pharmacy Med Name: SIMVASTATIN 20MG TAB]; TAKE 1 TABLET BY MOUTH ONCE AT BEDTIME  Dispense: 30 tablet; Refill: 11    8. Coronary atherosclerosis  - spironolactone (ALDACTONE) 25 MG tablet [Pharmacy Med Name: SPIRONOLACTONE 25MG TAB]; TAKE 1 TABLET BY MOUTH ONCE DAILY  Dispense: 30 tablet; Refill: 11    If protocol passes may refill for 6 months if within 3 months of last provider visit (or a total of 9 months).              Passed - Blood pressure filed in past 12 months     BP Readings from Last 1 Encounters:   12/20/19 122/81             levETIRAcetam (KEPPRA) 500 MG tablet [Pharmacy Med Name: LEVETIRACETAM 500MG TAB] 60 tablet 11     Sig: TAKE 1 TABLET BY MOUTH TWO TIMES A DAY       Gabapentin/Levetiracetam/Tiagabine Refill Protocol  Passed - 1/10/2020  2:53 PM        Passed - PCP or prescribing provider visit in past 12 months or next 3 months     Last office visit with prescriber/PCP: 10/24/2019 Lela Randolph MD OR same dept: 10/24/2019 Lela Randolph MD OR same specialty: 10/24/2019 Lela Randolph MD  Last physical: Visit date not found Last MTM visit: Visit date not found   Next visit within 3 mo: Visit date not found  Next physical within 3 mo: Visit date not found  Prescriber OR PCP: Lela Randolph MD  Last diagnosis associated with med order: 1. Paroxysmal atrial fibrillation (H)  - ELIQUIS 5 mg Tab tablet [Pharmacy Med Name: ELIQUIS 5MG TAB]; TAKE 1 TABLET BY MOUTH TWO TIMES A DAY  Dispense: 60  tablet; Refill: 11    2. Mild episode of recurrent major depressive disorder (H)  - FLUoxetine (PROZAC) 40 MG capsule [Pharmacy Med Name: FLUOXETINE 40MG CAP]; TAKE 1 CAPSULE BY MOUTH ONCE DAILY  Dispense: 30 capsule; Refill: 11    3. CAD (coronary artery disease)  - isosorbide mononitrate (IMDUR) 30 MG 24 hr tablet [Pharmacy Med Name: ISOSORBIDE MN ER 30MG TAB]; TAKE 1 TABLET BY MOUTH TWO TIMES A DAY  Dispense: 60 tablet; Refill: 11    4. Partial symptomatic epilepsy with complex partial seizures, not intractable, without status epilepticus (H)  - levETIRAcetam (KEPPRA) 500 MG tablet [Pharmacy Med Name: LEVETIRACETAM 500MG TAB]; TAKE 1 TABLET BY MOUTH TWO TIMES A DAY  Dispense: 60 tablet; Refill: 11    5. Back pain, unspecified back location, unspecified back pain laterality, unspecified chronicity  - meloxicam (MOBIC) 7.5 MG tablet [Pharmacy Med Name: MELOXICAM 7.5MG TAB]; TAKE 1 TABLET BY MOUTH ONCE DAILY  Dispense: 30 tablet; Refill: 11    6. GERD (gastroesophageal reflux disease)  - omeprazole (PRILOSEC) 20 MG capsule [Pharmacy Med Name: OMEPRAZOLE 20MG CAP]; TAKE 1 CAPSULE BY MOUTH ONCE DAILY  Dispense: 30 capsule; Refill: 11    7. Hyperlipidemia  - simvastatin (ZOCOR) 20 MG tablet [Pharmacy Med Name: SIMVASTATIN 20MG TAB]; TAKE 1 TABLET BY MOUTH ONCE AT BEDTIME  Dispense: 30 tablet; Refill: 11    8. Coronary atherosclerosis  - spironolactone (ALDACTONE) 25 MG tablet [Pharmacy Med Name: SPIRONOLACTONE 25MG TAB]; TAKE 1 TABLET BY MOUTH ONCE DAILY  Dispense: 30 tablet; Refill: 11    If protocol passes may refill for 12 months if within 3 months of last provider visit (or a total of 15 months).             meloxicam (MOBIC) 7.5 MG tablet [Pharmacy Med Name: MELOXICAM 7.5MG TAB] 30 tablet 11     Sig: TAKE 1 TABLET BY MOUTH ONCE DAILY       There is no refill protocol information for this order        nitroglycerin (NITROSTAT) 0.4 MG SL tablet [Pharmacy Med Name: NITROGLYCERIN 0.4MG TAB SL] 25 tablet 11     Sig:  TAKE 1 TABLET SUBLINGUALLY AS NEEDED, MAY REPEAT EVERY 5 MIN FOR 3 DOSES       There is no refill protocol information for this order        omeprazole (PRILOSEC) 20 MG capsule [Pharmacy Med Name: OMEPRAZOLE 20MG CAP] 30 capsule 11     Sig: TAKE 1 CAPSULE BY MOUTH ONCE DAILY       GI Medications Refill Protocol Passed - 1/10/2020  2:53 PM        Passed - PCP or prescribing provider visit in last 12 or next 3 months.     Last office visit with prescriber/PCP: 10/24/2019 Lela Randolph MD OR same dept: 10/24/2019 Lela Randolph MD OR same specialty: 10/24/2019 Lela Randolph MD  Last physical: Visit date not found Last MTM visit: Visit date not found   Next visit within 3 mo: Visit date not found  Next physical within 3 mo: Visit date not found  Prescriber OR PCP: Lela Randolph MD  Last diagnosis associated with med order: 1. Paroxysmal atrial fibrillation (H)  - ELIQUIS 5 mg Tab tablet [Pharmacy Med Name: ELIQUIS 5MG TAB]; TAKE 1 TABLET BY MOUTH TWO TIMES A DAY  Dispense: 60 tablet; Refill: 11    2. Mild episode of recurrent major depressive disorder (H)  - FLUoxetine (PROZAC) 40 MG capsule [Pharmacy Med Name: FLUOXETINE 40MG CAP]; TAKE 1 CAPSULE BY MOUTH ONCE DAILY  Dispense: 30 capsule; Refill: 11    3. CAD (coronary artery disease)  - isosorbide mononitrate (IMDUR) 30 MG 24 hr tablet [Pharmacy Med Name: ISOSORBIDE MN ER 30MG TAB]; TAKE 1 TABLET BY MOUTH TWO TIMES A DAY  Dispense: 60 tablet; Refill: 11    4. Partial symptomatic epilepsy with complex partial seizures, not intractable, without status epilepticus (H)  - levETIRAcetam (KEPPRA) 500 MG tablet [Pharmacy Med Name: LEVETIRACETAM 500MG TAB]; TAKE 1 TABLET BY MOUTH TWO TIMES A DAY  Dispense: 60 tablet; Refill: 11    5. Back pain, unspecified back location, unspecified back pain laterality, unspecified chronicity  - meloxicam (MOBIC) 7.5 MG tablet [Pharmacy Med Name: MELOXICAM 7.5MG TAB]; TAKE 1 TABLET BY MOUTH ONCE DAILY  Dispense:  30 tablet; Refill: 11    6. GERD (gastroesophageal reflux disease)  - omeprazole (PRILOSEC) 20 MG capsule [Pharmacy Med Name: OMEPRAZOLE 20MG CAP]; TAKE 1 CAPSULE BY MOUTH ONCE DAILY  Dispense: 30 capsule; Refill: 11    7. Hyperlipidemia  - simvastatin (ZOCOR) 20 MG tablet [Pharmacy Med Name: SIMVASTATIN 20MG TAB]; TAKE 1 TABLET BY MOUTH ONCE AT BEDTIME  Dispense: 30 tablet; Refill: 11    8. Coronary atherosclerosis  - spironolactone (ALDACTONE) 25 MG tablet [Pharmacy Med Name: SPIRONOLACTONE 25MG TAB]; TAKE 1 TABLET BY MOUTH ONCE DAILY  Dispense: 30 tablet; Refill: 11    If protocol passes may refill for 12 months if within 3 months of last provider visit (or a total of 15 months).             simvastatin (ZOCOR) 20 MG tablet [Pharmacy Med Name: SIMVASTATIN 20MG TAB] 30 tablet 11     Sig: TAKE 1 TABLET BY MOUTH ONCE AT BEDTIME       Statins Refill Protocol (Hmg CoA Reductase Inhibitors) Passed - 1/10/2020  2:53 PM        Passed - PCP or prescribing provider visit in past 12 months      Last office visit with prescriber/PCP: 10/24/2019 Lela Randolph MD OR same dept: 10/24/2019 Lela Randolph MD OR same specialty: 10/24/2019 Lela Randolph MD  Last physical: Visit date not found Last MTM visit: Visit date not found   Next visit within 3 mo: Visit date not found  Next physical within 3 mo: Visit date not found  Prescriber OR PCP: Lela Randolph MD  Last diagnosis associated with med order: 1. Paroxysmal atrial fibrillation (H)  - ELIQUIS 5 mg Tab tablet [Pharmacy Med Name: ELIQUIS 5MG TAB]; TAKE 1 TABLET BY MOUTH TWO TIMES A DAY  Dispense: 60 tablet; Refill: 11    2. Mild episode of recurrent major depressive disorder (H)  - FLUoxetine (PROZAC) 40 MG capsule [Pharmacy Med Name: FLUOXETINE 40MG CAP]; TAKE 1 CAPSULE BY MOUTH ONCE DAILY  Dispense: 30 capsule; Refill: 11    3. CAD (coronary artery disease)  - isosorbide mononitrate (IMDUR) 30 MG 24 hr tablet [Pharmacy Med Name: ISOSORBIDE MN ER  30MG TAB]; TAKE 1 TABLET BY MOUTH TWO TIMES A DAY  Dispense: 60 tablet; Refill: 11    4. Partial symptomatic epilepsy with complex partial seizures, not intractable, without status epilepticus (H)  - levETIRAcetam (KEPPRA) 500 MG tablet [Pharmacy Med Name: LEVETIRACETAM 500MG TAB]; TAKE 1 TABLET BY MOUTH TWO TIMES A DAY  Dispense: 60 tablet; Refill: 11    5. Back pain, unspecified back location, unspecified back pain laterality, unspecified chronicity  - meloxicam (MOBIC) 7.5 MG tablet [Pharmacy Med Name: MELOXICAM 7.5MG TAB]; TAKE 1 TABLET BY MOUTH ONCE DAILY  Dispense: 30 tablet; Refill: 11    6. GERD (gastroesophageal reflux disease)  - omeprazole (PRILOSEC) 20 MG capsule [Pharmacy Med Name: OMEPRAZOLE 20MG CAP]; TAKE 1 CAPSULE BY MOUTH ONCE DAILY  Dispense: 30 capsule; Refill: 11    7. Hyperlipidemia  - simvastatin (ZOCOR) 20 MG tablet [Pharmacy Med Name: SIMVASTATIN 20MG TAB]; TAKE 1 TABLET BY MOUTH ONCE AT BEDTIME  Dispense: 30 tablet; Refill: 11    8. Coronary atherosclerosis  - spironolactone (ALDACTONE) 25 MG tablet [Pharmacy Med Name: SPIRONOLACTONE 25MG TAB]; TAKE 1 TABLET BY MOUTH ONCE DAILY  Dispense: 30 tablet; Refill: 11    If protocol passes may refill for 12 months if within 3 months of last provider visit (or a total of 15 months).             spironolactone (ALDACTONE) 25 MG tablet [Pharmacy Med Name: SPIRONOLACTONE 25MG TAB] 30 tablet 11     Sig: TAKE 1 TABLET BY MOUTH ONCE DAILY       Diuretics/Combination Diuretics Refill Protocol  Passed - 1/10/2020  2:53 PM        Passed - Visit with PCP or prescribing provider visit in past 12 months     Last office visit with prescriber/PCP: 10/24/2019 Lela Randolph MD OR same dept: 10/24/2019 Lela Randolph MD OR same specialty: 10/24/2019 Lela Randolph MD  Last physical: Visit date not found Last MTM visit: Visit date not found   Next visit within 3 mo: Visit date not found  Next physical within 3 mo: Visit date not  found  Prescriber OR PCP: Lela Randolph MD  Last diagnosis associated with med order: 1. Paroxysmal atrial fibrillation (H)  - ELIQUIS 5 mg Tab tablet [Pharmacy Med Name: ELIQUIS 5MG TAB]; TAKE 1 TABLET BY MOUTH TWO TIMES A DAY  Dispense: 60 tablet; Refill: 11    2. Mild episode of recurrent major depressive disorder (H)  - FLUoxetine (PROZAC) 40 MG capsule [Pharmacy Med Name: FLUOXETINE 40MG CAP]; TAKE 1 CAPSULE BY MOUTH ONCE DAILY  Dispense: 30 capsule; Refill: 11    3. CAD (coronary artery disease)  - isosorbide mononitrate (IMDUR) 30 MG 24 hr tablet [Pharmacy Med Name: ISOSORBIDE MN ER 30MG TAB]; TAKE 1 TABLET BY MOUTH TWO TIMES A DAY  Dispense: 60 tablet; Refill: 11    4. Partial symptomatic epilepsy with complex partial seizures, not intractable, without status epilepticus (H)  - levETIRAcetam (KEPPRA) 500 MG tablet [Pharmacy Med Name: LEVETIRACETAM 500MG TAB]; TAKE 1 TABLET BY MOUTH TWO TIMES A DAY  Dispense: 60 tablet; Refill: 11    5. Back pain, unspecified back location, unspecified back pain laterality, unspecified chronicity  - meloxicam (MOBIC) 7.5 MG tablet [Pharmacy Med Name: MELOXICAM 7.5MG TAB]; TAKE 1 TABLET BY MOUTH ONCE DAILY  Dispense: 30 tablet; Refill: 11    6. GERD (gastroesophageal reflux disease)  - omeprazole (PRILOSEC) 20 MG capsule [Pharmacy Med Name: OMEPRAZOLE 20MG CAP]; TAKE 1 CAPSULE BY MOUTH ONCE DAILY  Dispense: 30 capsule; Refill: 11    7. Hyperlipidemia  - simvastatin (ZOCOR) 20 MG tablet [Pharmacy Med Name: SIMVASTATIN 20MG TAB]; TAKE 1 TABLET BY MOUTH ONCE AT BEDTIME  Dispense: 30 tablet; Refill: 11    8. Coronary atherosclerosis  - spironolactone (ALDACTONE) 25 MG tablet [Pharmacy Med Name: SPIRONOLACTONE 25MG TAB]; TAKE 1 TABLET BY MOUTH ONCE DAILY  Dispense: 30 tablet; Refill: 11    If protocol passes may refill for 12 months if within 3 months of last provider visit (or a total of 15 months).             Passed - Serum Potassium in past 12 months      Lab Results    Component Value Date    Potassium 5.2 (H) 10/15/2019             Passed - Serum Sodium in past 12 months      Lab Results   Component Value Date    Sodium 138 10/15/2019             Passed - Blood pressure on file in past 12 months     BP Readings from Last 1 Encounters:   12/20/19 122/81             Passed - Serum Creatinine in past 12 months      Creatinine   Date Value Ref Range Status   10/15/2019 0.93 0.60 - 1.10 mg/dL Final             CHOLECALCIFEROL 25 mcg (1,000 unit) tablet [Pharmacy Med Name: VITAMIN D 1,000IU TAB] 30 tablet 11     Sig: TAKE 1 TABLET BY MOUTH ONCE DAILY       There is no refill protocol information for this order

## 2021-06-07 NOTE — TELEPHONE ENCOUNTER
----- Message from Debo Moreno sent at 4/28/2020  2:27 PM CDT -----      Primary cardiologist: Dr. Singh    Detailed reason for call: Chyna from a nursing home called to discuss with Dr. Singh how they can possibly do a video appointment with the patient since she is in a nursing home and just a land line. I offered to set up a telephone visit but she wanted to discuss another option. Please advise at [phone]

## 2021-06-12 NOTE — PROGRESS NOTES
[facility]     Shoulder Initial Evaluation          [facility] Certification Request    August 3, 2017      Patient: Jacklyn Thornton  MR Number: [id number]  YOB: 1931  Date of Visit: 8/2/2017      Dear Dr. Light    Thank you for this referral.   We are seeing Jacklyn Thornton for Physical Therapy of Lshld. adhesive capsulitis    Medicare and/or Medicaid requires physician review and approval of the treatment plan. Please review the plan of care and verify that you agree with the therapy plan of care by co-signing this note.      Plan of Care  Authorization / Certification Start Date: 08/02/17  Authorization / Certification End Date: 10/31/17  Authorization / Certification Number of Visits: 12  Communication with: Referral Source  Patient Related Instruction: Nature of Condition;Body mechanics;Posture;Treatment plan and rationale;Precautions;Next steps;Self Care instruction;Basis of treatment  Times per Week: 2  Number of Weeks: 6  Number of Visits: 12  Discharge Planning: Provide pt with exercise program to attain and maintain strength for functional performance wit L UE  Therapeutic Exercise: ROM;Stretching;Strengthening  Neuromuscular Reeducation: kinesio tape  Manual Therapy: soft tissue mobilization;craniosacral therapy;myofascial release;visceral manipulation;muscle energy;strain counterstrain    Goals:  Pt. will demonstrate/verbalize independence in self-management of condition in : 6 weeks  Pt. will be independent with home exercise program in : 6 weeks  Patient will ascend / descend: stairs;with assistive device;independently;with less pain;with less difficulty;in 6 weeks  Patient will transfer: sit/stand;supine/sit;for toileting;for in/out of bed;for car;for in/out of chair;with less pain;with less difficulty;in 6 weeks  Patient will reach / maintain arm movement: forward;for home chores;for dressing;for other activity;with partial ROM;with less pain;with less  difficulty;in 6 weeks;Comment  Comment: Pt will be able to use her L Upper Extremity on hand rail in stair climbing, transfers with dcreased pain  No Data Recorded      If you have any questions or concerns, please don't hesitate to call.    Sincerely,      Herbert German, PT        Physician recommendation:     ___ Follow therapist's recommendation        ___ Modify therapy      *Physician co-signature indicates they certify the need for these services furnished within this plan and while under their care.      Patient Name: Jacklyn Thornton  Date of evaluation: 8/3/2017  Referral Diagnosis: Adhesive capsulitis of left shoulder  Referring provider: Milka Light MD  Visit Diagnosis:     ICD-10-CM    1. Pain in joint of left shoulder M25.512    2. Pain in joint, upper arm, left M25.522    3. Pain in joint, forearm, left M25.532    4. Arthralgia of left hand M25.542    5. Adhesive capsulitis of left shoulder M75.02        Assessment:      Pt. is appropriate for skilled PT intervention as outlined in the Plan of Care (POC).  Pt. is a good candidate for skilled PT services to improve pain levels and function.    Goals:  Pt. will demonstrate/verbalize independence in self-management of condition in : 6 weeks  Pt. will be independent with home exercise program in : 6 weeks  Patient will ascend / descend: stairs;with assistive device;independently;with less pain;with less difficulty;in 6 weeks  Patient will transfer: sit/stand;supine/sit;for toileting;for in/out of bed;for car;for in/out of chair;with less pain;with less difficulty;in 6 weeks  Patient will reach / maintain arm movement: forward;for home chores;for dressing;for other activity;with partial ROM;with less pain;with less difficulty;in 6 weeks;Comment  Comment: Pt will be able to use her L Upper Extremity on hand rail in stair climbing, transfers with dcreased pain  No Data Recorded    Patient's expectations/goals are realistic.    Barriers to Learning or  Achieving Goals:  Chronicity of the problem.  Co-morbidities or other medical factors.  HBP,  Heart condition       Plan / Patient Instructions:        Plan of Care:   Authorization / Certification Start Date: 08/02/17  Authorization / Certification End Date: 10/31/17  Authorization / Certification Number of Visits: 12  Communication with: Referral Source  Patient Related Instruction: Nature of Condition;Body mechanics;Posture;Treatment plan and rationale;Precautions;Next steps;Self Care instruction;Basis of treatment  Times per Week: 2  Number of Weeks: 6  Number of Visits: 12  Discharge Planning: Provide pt with exercise program to attain and maintain strength for functional performance wit L UE  Therapeutic Exercise: ROM;Stretching;Strengthening  Neuromuscular Reeducation: kinesio tape  Manual Therapy: soft tissue mobilization;craniosacral therapy;myofascial release;visceral manipulation;muscle energy;strain counterstrain    Pt. is in agreement with the Plan of Care  Plan for next visit: Initiate PT per POC  .   Subjective:       Social information:   Living Situation:apartment and lives alone   Occupation:retired   Work Status:NA   Equipment Available: None and SE cane    History of Present Illness:    Jacklyn is a 86 y.o. female who presents to therapy today with complaints of L Shld pain. Date of onset/duration of symptoms is 7/27/17. Onset was gradual. Symptoms are constant and not improving. She reports  an episodic  history of similar symptoms. She describes their previous level of function as not limited    Pain Rating:10  Pain rating at best: 5  Pain rating at worst: 10  Pain description: burning, sharp and tingling    Functional limitations are described as occurring with:   Resolution of bilat 3rd didgit trigger fingers  personal cares dressing lower body, donning shoes and socks, donning shirt or jacket, donning bra, combing hair, washing hair and washing body  reaching at shoulder height  repetitive  movements  performing routine daily activities  sleeping  Lifting objects    Patient reports benefit from:  pain medication, physical therapy       Objective:      Note: Items left blank indicates the item was not performed or not indicated at the time of the evaluation.    Patient Outcome Measures :    Shoulder Pain and Disability Index (SPADI) in %: 76   Scores range from 0-100%, where a score of 0% represents minimal pain and maximal function. The minimal clincically important difference is a score reduction of 10%.    Shoulder Examination  1. Pain in joint of left shoulder     2. Pain in joint, upper arm, left     3. Pain in joint, forearm, left     4. Arthralgia of left hand     5. Adhesive capsulitis of left shoulder       Involved side: Left  Posture Observation:      General sitting posture is  fair.  General standing posture is fair.  Cervical:  Moderate forward head  Shoulder/Thoracic complex: Moderate bilateral scapular protraction   Cervical Clearing: Normal on the R restricted on L    Shoulder/Elbow ROM    Date:      Shoulder and Elbow ROM ( )   AROM in degrees AROM in degrees AROM in degrees    Right Left Right Left Right Left   Shoulder Flexion (0-180 ) 135 93       Shoulder Abduction (0-180 ) 170 70       Shoulder Extension (0-60 )         Shoulder ER (0-90 ) WNL 7       Shoulder IR (0-70 ) WNL 48       Shoulder IR/Ext         Elbow Flexion (150 )         Elbow Extension (0 )              Palpation: Lymphatic restrictions to the L upper quadrant and shd  4 sec / cycle.   Dural restrictions at C2,4,6,7   Trigger fingers to 3rd didgit of both Hands    Joint Assessment:  Sternoclavicular Joint Assessment: Not Indicated Hypomobile   Acromioclavicular Joint Assessment: Hypomobile   Scapulothoracic Joint Assessment: Hypomobile.    Shoulder Special Tests     Impingement Cluster Right (+/-) Left (+/-) Rotator Cuff Tests Right (+/-) Left (+/-)   Abarca-Vineet - + Drop Arm Sign     Painful Arc   Hornblowers      Infraspinatus Test   ERLS     AC Tests Right (+/-) Left (+/-) IRLS     Active Compression   Labral Tests Right (+/-) Left (+/-)   Crossbody Adduction   Biceps Load Test II     AC Resisted Extension   Jerk Test     GH Instability Tests Right (+/-) Left (+/-) Heaven Test     Sulcus Sign   Biceps Tests Right (+/-) Left (+/-)   Apprehension   Speed     Relocation   Swapna Manley - + Other:     Other:   Other:         Treatment Today    TREATMENT MINUTES COMMENTS   Evaluation 30    Self-care/ Home management     Manual therapy 30 MFR with OA release, dural tube pull  SCS to DS-A L,   DS-N L,   SUP-N,   LONG-N,   Thorocodorsal - N,       Tender points resolved.  Lymphatic flow to the UE's restored.  P's Trigger finger issue resolved with KTing   For this condition   Dural restrictions resolved   Neuromuscular Re-education     Therapeutic Activity     Therapeutic Exercises     Gait training     Modality__________________                Total 60    Blank areas are intentional and mean the treatment did not include these items.       PT Evaluation Code: (Please list factors)  Patient History/Comorbidities: 2  Heart condition,  HBP  Examination: 3   Shoulder impingment,   Lymphatic restrictions,   Neuro and ligamentus restrictions  Clinical Presentation:evolving  Clinical Decision Making: moderate    Patient History/  Comorbidities Examination  (body structures and functions, activity limitations, and/or participation restrictions) Clinical Presentation Clinical Decision Making (Complexity)   No documented Comorbidities or personal factors 1-2 Elements Stable and/or uncomplicated Low   1-2 documented comorbidities or personal factor 3 Elements Evolving clinical presentation with changing characteristics Moderate   3-4 documented comorbidities or personal factors 4 or more Unstable and unpredictable High Herbert German  8/3/2017  11:44 AM

## 2021-06-12 NOTE — PROGRESS NOTES
[facility] Daily Progress     Patient Name: Jacklyn Thornton  Date: 2017  Visit #: 6  PTA visit #:    Referral Diagnosis: Pain in joint of left shoulder  Referring provider: Milka Light MD  Visit Diagnosis:     ICD-10-CM    1. Pain in joint of left shoulder M25.512    2. Pain in joint, upper arm, left M25.522    3. Pain in joint, forearm, left M25.532    4. Arthralgia of left hand M25.542    5. Adhesive capsulitis of left shoulder M75.02          Assessment:     Patient is benefitting from skilled physical therapy and is making steady progress toward functional goals.  Patient is appropriate to continue with skilled physical therapy intervention, as indicated by initial plan of care.  She did have slight increase in ROM today. She did have slight decrease in pain with treatment as well.     Plan / Patient Education:     Continue with initial plan of care.  Progress with home program as tolerated.       Subjective:   .  Pain Ratin in the upper arm    The pain doesn't seem to have gone down any. It is the worst pain from the elbow to the shoulder.   She did work on sleeping position and did sleep a little better. The night before she didn't sleep but it wasn't due to the pain in her shoulder/arm.     Objective:     L shoulder ROM: Flexion: 95 degrees pain pre treatment    100 deg post treatment  MS fascial tension.     Treatment Today   2017   TREATMENT MINUTES COMMENTS   Evaluation     Self-care/ Home management     Manual therapy 25 Fascial release using Strain-Counterstrain of T2/3UE-MS  Long axis distraction of LUE  AP GHJ mob (grade 2-3) in supine   Neuromuscular Re-education     Therapeutic Activity     Therapeutic Exercises     Gait training     Modality__________________                Total 25    Blank areas are intentional and mean the treatment did not include these items.       Garrison Naylor,CLT  2017

## 2021-06-12 NOTE — PROGRESS NOTES
[facility] Daily Progress     Patient Name: Jacklyn Thornton  Date: 2017  Visit #: 2  PTA visit #:  1  Referral Diagnosis: Pain in joint of left shoulder  Referring provider: Milka Light MD  Visit Diagnosis:     ICD-10-CM    1. Pain in joint of left shoulder M25.512    2. Pain in joint, upper arm, left M25.522    3. Pain in joint, forearm, left M25.532    4. Arthralgia of left hand M25.542    5. Adhesive capsulitis of left shoulder M75.02          Assessment:     Patient is benefitting from skilled physical therapy and is making steady progress toward functional goals.  Patient is appropriate to continue with skilled physical therapy intervention, as indicated by initial plan of care.  She did have improved ROM again today post treatment. She did have decreased pain as well.     Plan / Patient Education:     Continue with initial plan of care.  Progress with home program as tolerated.       Subjective:   .  Pain Ratin in the upper arm    The worst part of the arm is from the shoulder to the elbow. The lower arm isn't hurting as much.   She felt fine after the last Rx. She does feel like she is able to use it more.      Objective:     L shoulder ROM: Flexion: 100 degrees pain post treatment  Neural fascial tension.     Treatment Today   2017   TREATMENT MINUTES COMMENTS   Evaluation     Self-care/ Home management     Manual therapy 25 Fascial release using Strain-Counterstrain of BUE/brachial plexus/SUBC/RLAR/DS-N   Neuromuscular Re-education     Therapeutic Activity     Therapeutic Exercises     Gait training     Modality__________________                Total 25    Blank areas are intentional and mean the treatment did not include these items.       Garrison Naylor,AMANDA  2017

## 2021-06-12 NOTE — PROGRESS NOTES
[facility] Daily Progress     Patient Name: Jacklyn Thornton  Date: 8/15/2017  Visit #: 2  PTA visit #:  1  Referral Diagnosis: Pain in joint of left shoulder  Referring provider: Milka Light MD  Visit Diagnosis:     ICD-10-CM    1. Pain in joint of left shoulder M25.512    2. Pain in joint, upper arm, left M25.522    3. Pain in joint, forearm, left M25.532    4. Arthralgia of left hand M25.542    5. Adhesive capsulitis of left shoulder M75.02    6. Limited joint range of motion (ROM) M25.60    7. Muscle weakness (generalized) M62.81          Assessment:   Tender L biceps and Supraspinatus tendons.  Patient is benefitting from skilled physical therapy and is making steady progress toward functional goals.  Patient is appropriate to continue with skilled physical therapy intervention, as indicated by initial plan of care.    Goal Status: Ongoing  Pt. will demonstrate/verbalize independence in self-management of condition in : 6 weeks  Pt. will be independent with home exercise program in : 6 weeks  Patient will ascend / descend: stairs;with assistive device;independently;with less pain;with less difficulty;in 6 weeks  Patient will transfer: sit/stand;supine/sit;for toileting;for in/out of bed;for car;for in/out of chair;with less pain;with less difficulty;in 6 weeks  Patient will reach / maintain arm movement: forward;for home chores;for dressing;for other activity;with partial ROM;with less pain;with less difficulty;in 6 weeks;Comment  Comment: Pt will be able to use her L Upper Extremity on hand rail in stair climbing, transfers with dcreased pain  No Data Recorded    Plan / Patient Education:     Continue with initial plan of care.  Progress with home program as tolerated.   Pt was instructed to use ice after her exercsies    Subjective:   Pt reports she took a pain pill this morning.  Pain Ratin        Objective:   L shoulder ROM:  Flexion: 75 degrees pain  AB: 65 degrees pain  ER: 35 pain  "into L hand  IR: Thumb to L buttock        Treatment Today     TREATMENT MINUTES COMMENTS   Evaluation     Self-care/ Home management     Manual therapy 15 MFR/STM to L upper traps, L pec major, L infraspinatus, L Supraspinatus, gentle CFM to L biceps and Supraspinatus tendons   Neuromuscular Re-education     Therapeutic Activity     Therapeutic Exercises 20 HEP:   #1: table slides for flexion and AB x5 each  #2: scapular retraction 5\" x5  #3: submax shoulder isometrics iR and ER (seated, pushed into a pillow) 5\" x5   Gait training     Modality__________________                Total 35    Blank areas are intentional and mean the treatment did not include these items.       Tash Ahumada,NAVJOTT  8/15/2017      "

## 2021-06-12 NOTE — PROGRESS NOTES
[facility] Daily Progress     Patient Name: Jacklyn Thornton  Date: 8/23/2017  Visit #: 2  PTA visit #:  1  Referral Diagnosis: Pain in joint of left shoulder  Referring provider: Milka Light MD  Visit Diagnosis:     ICD-10-CM    1. Pain in joint of left shoulder M25.512    2. Pain in joint, upper arm, left M25.522    3. Pain in joint, forearm, left M25.532    4. Arthralgia of left hand M25.542    5. Adhesive capsulitis of left shoulder M75.02          Assessment:     Patient is benefitting from skilled physical therapy and is making steady progress toward functional goals.  Patient is appropriate to continue with skilled physical therapy intervention, as indicated by initial plan of care.  There was a good increase in ROM post treatment today. She did report less pain as well.     Plan / Patient Education:     Continue with initial plan of care.  Progress with home program as tolerated.       Subjective:   .  Pain Rating: 10  The shoulder and hips are still very painful. She felt like it might have seemed a little better af the time of the last Rx but it hasn't felt that good since.   She had to take 2 pain pills today due to her pain.     Objective:     L shoulder ROM:  Flexion: 93 degrees pain  Neural, MS fascial tension.         Treatment Today     TREATMENT MINUTES COMMENTS   Evaluation     Self-care/ Home management     Manual therapy 25 Fascial release using Strain-Counterstrain of B UELF-MS, B brachial plexus-N, L AXIL-N   Neuromuscular Re-education     Therapeutic Activity     Therapeutic Exercises     Gait training     Modality__________________                Total 25    Blank areas are intentional and mean the treatment did not include these items.       Garrison Naylor,AMANDA  8/23/2017

## 2021-06-12 NOTE — PROGRESS NOTES
[facility] Daily Progress     Patient Name: Jacklyn Thornton  Date: 2017  Visit #: 5  PTA visit #:    Referral Diagnosis: Pain in joint of left shoulder  Referring provider: Milka Light MD  Visit Diagnosis:     ICD-10-CM    1. Pain in joint of left shoulder M25.512    2. Pain in joint, upper arm, left M25.522    3. Pain in joint, forearm, left M25.532    4. Arthralgia of left hand M25.542    5. Adhesive capsulitis of left shoulder M75.02          Assessment:     Patient is benefitting from skilled physical therapy and is making steady progress toward functional goals.  Patient is appropriate to continue with skilled physical therapy intervention, as indicated by initial plan of care.  She has maintained much of her ROM today. She needs to improve on her support in her arm when lying in bed.     Plan / Patient Education:     Continue with initial plan of care.  Progress with home program as tolerated.       Subjective:   .  Pain Ratin in the upper arm    It was a difficult night last night. She does feel like her arm was keeping her up last night. She also had a really bad HA which is still around.  The HA's are normal but when she gets them they are usually good ones.    The arm is still moving easier but the pain is still in the arm.     Objective:     L shoulder ROM: Flexion: 95 degrees pain post treatment   LV fascial tension.     Treatment Today   2017   TREATMENT MINUTES COMMENTS   Evaluation     Self-care/ Home management     Manual therapy 25 Fascial release using Strain-Counterstrain of LUE/scap/sternal-LV   Neuromuscular Re-education     Therapeutic Activity     Therapeutic Exercises     Gait training     Modality__________________                Total 25    Blank areas are intentional and mean the treatment did not include these items.       Garrison Naylor,AMANDA  2017

## 2021-06-12 NOTE — PROGRESS NOTES
Assessment:     Jacklyn was seen today for medication refill.    Diagnoses and all orders for this visit:    Adhesive capsulitis of left shoulder  -     Ambulatory referral to PT/OT  -     Discontinue: HYDROcodone-acetaminophen 5-325 mg per tablet; Take 1-2 tablets by mouth every 6 (six) hours as needed for pain.  -     HYDROcodone-acetaminophen 5-325 mg per tablet; Take 1-2 tablets by mouth every 6 (six) hours as needed for pain.    Hyperlipidemia  -     simvastatin (ZOCOR) 20 MG tablet; Take 1 tablet (20 mg total) by mouth at bedtime.  -     Lipid Cascade  -     Hepatic Profile    Coronary atherosclerosis  -     isosorbide mononitrate (IMDUR) 30 MG 24 hr tablet; Take 1 tablet (30 mg total) by mouth 2 (two) times a day.  -     Lipid Cascade    Essential hypertension  -     Renal Function Profile  -     Hemoglobin    Pain        Plan:     1. Adhesive capsulitis of left shoulder  Patient comes in for follow-up of her medical issues and her medicine refills.  She uses Vicodin for pain in her left shoulder and in her back.  Currently her left shoulder is get a frozen shoulder picture that is recurred.  A year ago she took physical therapy and it helped but now it is not helping.  She had some difficulty recently getting a refill on her Vicodin mainly because she has not been seen recently in the clinic.  We do go ahead and signed a controlled substance agreement today and get that on record also her PHQ 9 is 14/27 mainly because she is not sleeping well due to shoulder pain.    - Ambulatory referral to PT/OT  - HYDROcodone-acetaminophen 5-325 mg per tablet; Take 1-2 tablets by mouth every 6 (six) hours as needed for pain.  Dispense: 90 tablet; Refill: 0    2. Hyperlipidemia  Continue with her simvastatin and check her lipid Cascade today  - simvastatin (ZOCOR) 20 MG tablet; Take 1 tablet (20 mg total) by mouth at bedtime.  Dispense: 90 tablet; Refill: 3  - Lipid Cascade  - Hepatic Profile    3. Coronary  atherosclerosis  Continue with her iMdur blood pressure is good I will continue with door for cardiovascular benefit and relief of angina  - isosorbide mononitrate (IMDUR) 30 MG 24 hr tablet; Take 1 tablet (30 mg total) by mouth 2 (two) times a day.  Dispense: 90 tablet; Refill: 3  - Lipid Cascade    4. Essential hypertension  Current medicines and check a renal and hemoglobin  - Renal Function Profile  - Hemoglobin    5. Pain  Refill of pain medicine and it controlled substance agreement is re-signed today.  She got just a 30 tablet supply which will last her about 2 weeks I did print a new month prescription for her but she left before collecting it so will call her back to come in and get that      This is a 25 minute visit with greater than 50% of the time spent counseling regarding pain control with her Vicodin discussed.  New controlled substance agreement placed on file.  I advised her that she be seen twice a year for a pain medicine evaluation and her coronary artery disease/hypertension evaluation.      Subjective:      Jeyson is a 86 y.o. female presenting to my clinic for follow-up of her problems med refills and new issues.    She has hypertension coronary disease hyperlipidemia and has had in the past a frozen shoulder.  She did have physical therapy last year and this worked well for her but now she seems to have a relapse of her symptoms.  She brings shows me her shoulder today.  I suggest that we do a redo of her physical therapy business and she agrees to this.    She saw cardiology recently.  Meds are reviewed.    She is on lipid medicine and needs a lipid cascade.    She has a new frozen shoulder and is using Vicodin for her shoulder and her low back.    She has had back surgery and knee surgery and  surgery to her toes.  .  Patient continues to drive and drove herself here today.  She denies any adjuvant angina or shortness of breath.  Her worst problem is it with the left  shoulder      Current Outpatient Prescriptions on File Prior to Visit   Medication Sig Dispense Refill     amoxicillin (AMOXIL) 500 MG capsule TAKE 4 CAPSULES BY MOUTH 1 HOUR PRIOR TO DENTAL APPOINTMENT. 4 capsule 1     aspirin 81 mg chewable tablet Chew 81 mg daily.       cholecalciferol, vitamin D3, (VITAMIN D3) 2,000 unit cap Take 2,000 Units by mouth daily.       meloxicam (MOBIC) 7.5 MG tablet Take 7.5 mg by mouth at bedtime.       multivitamin therapeutic (THERAGRAN) tablet Take 1 tablet by mouth daily.       nitroglycerin (NITROSTAT) 0.4 MG SL tablet Place 0.4 mg under the tongue every 5 (five) minutes as needed for chest pain.       omeprazole (PRILOSEC) 20 MG capsule Take 20 mg by mouth Daily before breakfast.       sertraline (ZOLOFT) 50 MG tablet Take 50 mg by mouth daily.       sotalol (BETAPACE) 120 MG tablet Take 120 mg by mouth daily.       spironolactone (ALDACTONE) 25 MG tablet Take 25 mg by mouth daily.       [DISCONTINUED] HYDROcodone-acetaminophen 5-325 mg per tablet Take 1-2 tablets by mouth every 6 (six) hours as needed for pain. 30 tablet 0     [DISCONTINUED] isosorbide mononitrate (IMDUR) 30 MG 24 hr tablet Take 1 tablet (30 mg total) by mouth 2 (two) times a day. 90 tablet 0     [DISCONTINUED] simvastatin (ZOCOR) 20 MG tablet Take 1 tablet (20 mg total) by mouth at bedtime. 90 tablet 0     No current facility-administered medications on file prior to visit.      No Known Allergies  Past Medical History:   Diagnosis Date     Coronary artery disease      GERD (gastroesophageal reflux disease)      Hypertension      Past Surgical History:   Procedure Laterality Date     OK ABLATE HEART DYSRHYTHM FOCUS      Description: Catheter Ablation Atrial Supraventricular Tachycardia;  Recorded: 12/04/2012;     Social History     Social History     Marital status:      Spouse name: N/A     Number of children: N/A     Years of education: N/A     Occupational History     Not on file.     Social  History Main Topics     Smoking status: Former Smoker     Smokeless tobacco: Never Used     Alcohol use Not on file     Drug use: Not on file     Sexual activity: Not on file     Other Topics Concern     Not on file     Social History Narrative     Family History   Problem Relation Age of Onset     Hypertension Father        ROS:  I have performed a 10 point ROS.  All pertinent positives and negatives are found in the HPI.  All others are negative.    Denies angina denies shortness of breath.  States she is says she has lost weight currently is 135 pounds she says she is a size 8    Objective:     Physical Exam:  /80 (Patient Site: Left Arm, Patient Position: Sitting, Cuff Size: Adult Regular)  Pulse 80  Wt 135 lb (61.2 kg)  BMI 27.27 kg/m2  General Appearance: Alert, cooperative, no distress, appears stated age      Neck: Supple, symmetrical, trachea midline, no adenopathy;  thyroid: not enlarged, symmetric, no tenderness/mass/nodules; no carotid bruit or JVD  Lungs: Clear to auscultation bilaterally, respirations unlabored  Heart: Regular rate and rhythm, S1 and S2 normal, no murmur, rub, or gallop,   .  Abdomen: Soft, non-tender, bowel sounds active all four quadrants,  no masses, no organomegaly  Back: Symmetric, no curvature, ROM normal, mild low back tenderness  Extremities: Abnormal pain on left shoulder unable to lift arm up to the side of her body.  Tenderness and shoulder extending down to her arm.  All on the left  Skin: Skin color, texture, turgor normal, no rashes or lesions  Lymph nodes: Cervical, supraclavicular, and axillary nodes normal  Neurologic: Intact, no focal deficits   Mental status:  Appropriate, Affect normal    Labs:  Past lab tests reviewed    Workup per cardiology reviewed

## 2021-06-13 NOTE — PROGRESS NOTES
[facility] Discharge Summary  Patient Name: Jacklyn Thornton  Date: 10/16/2017  Referral Diagnosis: L adhesive capsulitis   Referring provider: Milka Light MD  Visit Diagnosis:   1. Pain in joint of left shoulder     2. Pain in joint, upper arm, left     3. Pain in joint, forearm, left     4. Arthralgia of left hand     5. Adhesive capsulitis of left shoulder         Goals:  Pt. will demonstrate/verbalize independence in self-management of condition in : 6 weeks  Pt. will be independent with home exercise program in : 6 weeks  Patient will ascend / descend: stairs;with assistive device;independently;with less pain;with less difficulty;in 6 weeks  Patient will transfer: sit/stand;supine/sit;for toileting;for in/out of bed;for car;for in/out of chair;with less pain;with less difficulty;in 6 weeks  Patient will reach / maintain arm movement: forward;for home chores;for dressing;for other activity;with partial ROM;with less pain;with less difficulty;in 6 weeks;Comment  Comment: Pt will be able to use her L Upper Extremity on hand rail in stair climbing, transfers with dcreased pain  No Data Recorded    Patient was seen for 6 visits from 8/2/17 to 8/30/17 with 0 missed appointments.  The patient attended therapy initially, but did not finish the therapy sessions prescribed.  Goals were not fully achieved. Explanation for goals not achieved: she did not con't to schedule follow up appointments.     Therapy will be discontinued at this time.  The patient will need a new referral to resume.    Thank you for your referral.  Garrison Garcia  10/16/2017  5:21 PM

## 2021-06-14 NOTE — TELEPHONE ENCOUNTER
Reason contacted:  Orders request  Information relayed:    [facility] [employer] [facility]   Phone number: [phone]       [facility] is called to determine whether or not Dr. Randolph feels a wheelchair is medically necessary for this patient?     I do not see any documentation from you in regards to a wheelchair and this patient has not been seen since 10/24/2019.    Caller was uncertain what patient uses the wheelchair for but provided an ICD-10 code of A60716D which appears to be for a closed wedge compression fracture of T8 vertebra     Please advise   Additional questions:  No  Further follow-up needed:  Yes  Okay to leave a detailed message:  Yes - [phone]

## 2021-06-14 NOTE — TELEPHONE ENCOUNTER
Called yael back. Would agree that a compression fracture would indicate a need for a wheelchair but have not seen patient in over a year.  Discussed this with patient's advocate who will talk to the patient's daughter and recommend patient be seen in clinic.

## 2021-06-14 NOTE — PROGRESS NOTES
Assessment/Plan:     Chest pain with a history of coronary artery disease, MI, and stent placement: The chest pain does not seem indicative of a cardiac etiology, most [facility] EKG demonstrated bradycardia, possible LVH, and PVCs.  Patient did expands chest pain during the examination, but it resolved within 45 seconds.  No accompanying shortness of breath, dizziness, nausea, or diaphoresis.  Recommend echo for evaluation of 1 year shortness of breath on exertion.  Refilled nitroglycerin.  Continue aspirin.  Suspect we will discontinue the statin in the future.    Hypertension with lightheadedness: Patient's blood pressure is borderline low, with a history of dizziness and frequent falls.  Will decrease the Imdur to 30 mg once daily, instead of twice daily.  Suspect the bradycardia is also due to the sotalol.  Will make one change at a time.  Patient is to keep a blood pressure log and return in 2 weeks.    Back pain, hand pain, left shoulder pain: Suspect osteoarthritis plus or minus adhesive capsulitis left shoulder.  Discontinue meloxicam as patient has not noticed any benefit.  Encourage patient to keep the bottle, in case this experiment demonstrates that it is having some effect.  Refilled hydrocodone today, although had a discussion with her that this is likely not going to be a long-term medication and that a taper is possible in her future.  Patient did take Aleve this morning which demonstrated some improvement in the pain, which I suspect secondary to its anti-inflammatory properties.    Depression: Not significantly addressed at this visit.  Appears to be well-controlled per patient report.  We will likely switch sertraline to Paxil.    Healthcare maintenance: DEXA ordered and pending.      Problem List Items Addressed This Visit        ENT/CARD/PULM/ENDO Problems    Coronary Artery Disease     History of MI approximately 5 years ago, stent placed         Relevant Medications    nitroglycerin (NITROSTAT)  0.4 MG SL tablet    isosorbide mononitrate (IMDUR) 30 MG 24 hr tablet    Other Relevant Orders    Echo Complete       Other    Mild episode of recurrent major depressive disorder     Will likely change from sertraline to paxil in the future.           Other Visit Diagnoses     Chest pain, unspecified type    -  Primary    Relevant Orders    Echo Complete    CAD (coronary artery disease)        Relevant Medications    nitroglycerin (NITROSTAT) 0.4 MG SL tablet    isosorbide mononitrate (IMDUR) 30 MG 24 hr tablet    Other Relevant Orders    Echo Complete    SOB (shortness of breath)        Relevant Orders    Echo Complete    Screening for osteoporosis        Relevant Orders    DXA Bone Density Scan    Adhesive capsulitis of left shoulder        Relevant Medications    HYDROcodone-acetaminophen 5-325 mg per tablet          AVS printed and given to patient.  Return to clinic in 2 weeks.      Total time spent with patient was 40 minutes with greater than 50% spent in face-to-face counseling regarding the above plan.    Subjective:      Jacklyn Thornton is a 86 y.o. female who presents to clinic for establishment of care.    We went over her PMH and current problem list.    She had a long list of ROS, chest pain was a striking endorsement. She has been having this sharp pain every few days for a few months. Pain lasts several seconds and then self-resolves. Does not feel like how she felt with her MI. Nothing triggers it, nothing makes it better or worse. Does not appear to be worse with breathing. Does not get nauseous or sweaty when it happens. Does not appear to be related to exercise. She sees a cardiologist yearly. Last EKG was July 2017 and demonstrated sinus bradycardia, PVCs, and possible LVH.    Shortness of breath is exercise dependent. This has been going on for about one year. Denies pedal edema but then explained that she does have some dependent edema but localized to the calf.     Leg cramps are located in  the left calf. Denies thigh muscle cramps.     Patient does get dizzy when she changes positions. She has hit her head several times. She has fallen recently.     Patient also has back pain, left shoulder pain and decreased range of motion in the left shoulder, with distal fingertip pain.  She is currently on hydrocodone and meloxicam.  She states meloxicam is likely not working.  She was out of hydrocodone this morning and she took Aleve to some benefit.      Past Medical History, Family History, and Social History reviewed.     Review of systems is as stated in HPI.  Patient endorses: dizziness, headaches, memory loss, weakness, fatigue, chest pain, shortness of breath, leg cramps, joint pain, arthritis, back pain, increased urination (described as small dribbles in depends, no change from baseline), and easy bruising.  The remainder of the 10 system review is otherwise negative.    Objective:     /70  Pulse (!) 52  SpO2 93% There is no height or weight on file to calculate BMI.    Gen: Alert, NAD, appears stated age, normal hygiene   Eyes: conjunctivae without injection, sclera clear, EOMI  ENT/mouth: nares clear, septum midline, absent rhinorrhea, throat without injection, neck is supple, no thyroid enlargement  CV: bradycardic, no murmur appreciated, pedal edema absent bilaterally  Resp: CTAB, no wheezes, rales or ronchi  ABD: normoactive, non-tender to palpation, nondistended  MSK: grossly full range of motion in all joints, no obvious deformity  Neuro: CN II-XII grossly intact (suspect possible CN 7 mild dysfunction noticed with intermittently nonequal smile), no deficits in coordination  Psych: no apparent hallucinations or delusions, no pressured speech; alert, oriented x3, some slowed speech  SKIN: dry and without lesions  Heme/lymph: no pallor, no active bleeding/bruising, no adenopathy appreciated    This note has been dictated using voice recognition software. Any grammatical or context  distortions are unintentional and inherent to the the software.     Lela Randolph MD  Family [facility]

## 2021-06-16 PROBLEM — G40.209 COMPLEX PARTIAL SEIZURE (H): Status: ACTIVE | Noted: 2018-03-18

## 2021-06-16 PROBLEM — I63.9 SUBCORTICAL INFARCTION (H): Status: ACTIVE | Noted: 2018-03-16

## 2021-06-16 PROBLEM — F33.0 MILD EPISODE OF RECURRENT MAJOR DEPRESSIVE DISORDER (H): Status: ACTIVE | Noted: 2017-11-20

## 2021-06-16 PROBLEM — F51.01 PRIMARY INSOMNIA: Status: ACTIVE | Noted: 2018-02-22

## 2021-06-16 PROBLEM — G45.9 TRANSIENT ISCHEMIC ATTACK (TIA): Status: ACTIVE | Noted: 2019-10-16

## 2021-06-16 PROBLEM — R41.3 MEMORY DEFICIT: Status: ACTIVE | Noted: 2018-02-22

## 2021-06-16 PROBLEM — I48.20 CHRONIC ATRIAL FIBRILLATION (H): Status: ACTIVE | Noted: 2018-09-14

## 2021-06-16 PROBLEM — M81.0 AGE-RELATED OSTEOPOROSIS WITHOUT CURRENT PATHOLOGICAL FRACTURE: Status: ACTIVE | Noted: 2018-03-12

## 2021-06-16 PROBLEM — R07.9 CHEST PAIN: Status: ACTIVE | Noted: 2018-09-14

## 2021-06-16 NOTE — PROGRESS NOTES
Assessment/Plan:     Patient presented to clinic today for insomnia having tried 1 of her daughter's trazodone over the weekend.  She would like a prescription for this.  We discussed that she has not yet exhausted the sleep hygiene practices that might yields improved results prior to medication use or over-the-counter melatonin.  Handout given to patient for sleep hygiene homework and encourage patient to take up to 10 mg melatonin at night.  Patient would prefer trazodone, but is comfortable with the plan.  Will follow up in 1 week to ensure there is some efficacy.    Patient also has significant hypertension at this appointment.  Her previous blood pressure was borderline to low.  We had discontinued one half of the Imdur dosing at that appointment.  It is unclear if patient is actually compliant with her medication although she is adamant that she is.  Chose to return to the original dosing, maintain a blood pressure log, and return in 1 week.    Patient has many other medical conditions that need to be addressed, she was supposed to followed up 2 weeks after her last appointment and failed to do so.  We will try to address the rest of her medical complexity at next appointment.    Patient had already left by the time we received her PHQ 9.  It was incompletely filled out and did have a 2 circled for the thoughts of harming herself or wishing that she was dead.  Will address this at next appointment.    Problem List Items Addressed This Visit        ENT/CARD/PULM/ENDO Problems    Hypertension      Other Visit Diagnoses     Primary insomnia    -  Primary          AVS printed and given to patient.  Return to clinic in 1 weeks.    The following are part of a depression follow up plan for the patient:  mental health care assessment    Total time spent with patient was 25 minutes with greater than 50% spent in face-to-face counseling regarding the above plan.    This note has been dictated using voice recognition  software. Any grammatical or context distortions are unintentional and inherent to the the software.     Lela Randolph MD  Family [facility]      Subjective:      Jacklyn Thornton is a 86 y.o. female who presents to clinic for insomnia.    Insomnia:  - bedtime: 8:30-9, falls asleep 3 am  - wakes up: 7-7:30 till 10 when taking medication  - nap on couch, 1-1.5 hours mostdays  - no partner, no pets  - dark blinds  - no trouble upon falling asleep    Htn:  -Patient states she is unsure if she took her medication this morning.  Then she stated that she was certain she had taken it.  Then she stated that she is pretty certain she took it later in the morning.  When I expressed some frustration as to not being able to accurately did reduce patient's blood pressure based on her medication compliance, patient does state that she has a pillbox that she uses.  She never misses a day.    Patient's PHQ 9 today was incompletely filled out as she did not answer the first 2 questions.  However, despite that oversight, patient has a total score of 13.      Past Medical History, Family History, and Social History reviewed.     Review of systems is as stated in HPI.  Patient endorses: Double vision, blurry vision, tinnitus, dizziness, swelling in her legs, leg cramps, arthritis, back pain, headaches, and increased urination  The remainder of the 10 system review is otherwise negative.    Objective:     BP (!) 160/98  Pulse 73  SpO2 96% There is no height or weight on file to calculate BMI.    Gen: Alert, NAD, appears stated age, normal hygiene   Psych: no apparent hallucinations or delusions, no pressured speech; alert, oriented x3        Current Outpatient Prescriptions on File Prior to Visit   Medication Sig Dispense Refill     aspirin 81 mg chewable tablet Chew 81 mg daily.       cholecalciferol, vitamin D3, (VITAMIN D3) 2,000 unit cap Take 2,000 Units by mouth daily.       HYDROcodone-acetaminophen 5-325 mg per  tablet Take 1-2 tablets by mouth every 6 (six) hours as needed for pain. 90 tablet 0     multivitamin therapeutic (THERAGRAN) tablet Take 1 tablet by mouth daily.       nitroglycerin (NITROSTAT) 0.4 MG SL tablet Place 1 tablet (0.4 mg total) under the tongue every 5 (five) minutes as needed for chest pain. 20 tablet 0     omeprazole (PRILOSEC) 20 MG capsule Take 20 mg by mouth Daily before breakfast.       sertraline (ZOLOFT) 50 MG tablet Take 50 mg by mouth daily.       simvastatin (ZOCOR) 20 MG tablet Take 1 tablet (20 mg total) by mouth at bedtime. 90 tablet 3     sotalol (BETAPACE) 120 MG tablet Take 120 mg by mouth daily.       spironolactone (ALDACTONE) 25 MG tablet Take 25 mg by mouth daily.       [DISCONTINUED] isosorbide mononitrate (IMDUR) 30 MG 24 hr tablet Take 1 tablet (30 mg total) by mouth daily. 30 tablet 11     [DISCONTINUED] omeprazole (PRILOSEC) 20 MG capsule TAKE ONE CAPSULE BY MOUTH ONE TIME DAILY 90 capsule 3     [DISCONTINUED] omeprazole (PRILOSEC) 20 MG capsule TAKE ONE CAPSULE BY MOUTH ONE TIME DAILY 90 capsule 2     No current facility-administered medications on file prior to visit.

## 2021-06-16 NOTE — PROGRESS NOTES
Assessment/Plan:     Patient presents today to discuss insomnia.  Per patient report she did trial melatonin.  She did also try some of her daughter's trazodone seemed to work better.  She would prefer this medication.  I have some genuine concerns about starting this patient who already has memory deficits on any kind of a sedative.  I am disappointed that the melatonin did not seem to work for the patient, although I still have some suspicion that this may never have been purchased in the first place.  Will discuss with the medication management pharmacy team to see if doxepin would be a possible alternative or if they feel the trazodone is an appropriate medication and an 86-year-old female.  Discussed sleep hygiene again, suspect patient is not following any of these recommendations either.    Patient also has a diagnosis of osteoporosis.  She was reluctant but willing to go to the osteoporosis clinic.  Referral placed.    Patient also has a history of hypertension.  She is on 3 medications that can affect blood pressure.  Her blood pressure seemed to vary wildly which may be a function of medication compliance or poor functioning of her sphygmomanometer.  Encourage patient to present to clinic twice weekly for the next few weeks for a blood pressure only appointment.  We will continue to monitor these pressures and determine if a change in medication as indicated.    The visit was complicated by patient's memory deficits.  Would consider a neuropsych appointment for evaluation of cognitive functioning in the future.  Encourage patient to see me in 1 month.    Discontinued Medications:  There are no discontinued medications.      Return to clinic in 4 weeks.    The following are part of a depression follow up plan for the patient:  mental health care assessment    Total time spent with patient was 25 minutes with greater than 50% spent in face-to-face counseling regarding the above plan.    This note has been  dictated using voice recognition software. Any grammatical or context distortions are unintentional and inherent to the the software.     Lela Randolph MD  Family Medicine [facility]      Subjective:      Jacklyn Thornton is a 86 y.o. female who presents to clinic for follow up.    Patient is still concerned about insomnia.  Per patient report she took the melatonin once daily for 3 weeks up to 6 mg.  She noticed no change.  She threw them away.  She could not remember the dosing or the name of the medication.  Previously she stated she had purchased them but never did.  I have some small suspicion that this is still true.    Patient also was diagnosed osteoporosis via the DEXA scan.    Patient had a depression follow-up today with a PHQ 2 and a score of 2.    Patient has a history of hypertension.  She is currently on sotalol, Imdur, and Spironolactone.  Her blood pressure cuff was brought in today.  Her blood pressure today per our machine was 110/74 and per her machine the systolic was 145.  Her blood pressure log demonstrated values between 90s - 170s for systolic reading.  Although I suspect there is some major complaint about the actual sphygmomanometer and its accuracy, I also have some genuine concerns the patient is not taking her medication appropriately.  She makes comments consistently about taking her medications at different times during the day or forgetting to take them.      Past Medical History, Family History, and Social History reviewed.   History   Smoking Status     Former Smoker   Smokeless Tobacco     Never Used       Review of systems is as stated in HPI.  The remainder of the 10 system review is otherwise negative.    Objective:     /74  Pulse 65  SpO2 93% There is no height or weight on file to calculate BMI.    Gen: Alert, NAD, appears stated age, normal hygiene   Psych: no apparent hallucinations or delusions, no pressured speech; alert, oriented x3      Current Outpatient  Prescriptions on File Prior to Visit   Medication Sig Dispense Refill     aspirin 81 mg chewable tablet Chew 81 mg daily.       cholecalciferol, vitamin D3, (VITAMIN D3) 2,000 unit cap Take 2,000 Units by mouth daily.       FLUoxetine (PROZAC) 10 MG capsule Take 1 capsule (10 mg total) by mouth daily. 90 capsule 3     HYDROcodone-acetaminophen 5-325 mg per tablet Take 1-2 tablets by mouth every 6 (six) hours as needed for pain. 90 tablet 0     isosorbide mononitrate (IMDUR) 30 MG 24 hr tablet Take 1 tablet (30 mg total) by mouth 2 (two) times a day. 60 tablet 11     meloxicam (MOBIC) 7.5 MG tablet Take 7.5 mg by mouth daily.  1     multivitamin therapeutic (THERAGRAN) tablet Take 1 tablet by mouth daily.       nitroglycerin (NITROSTAT) 0.4 MG SL tablet Place 1 tablet (0.4 mg total) under the tongue every 5 (five) minutes as needed for chest pain. 20 tablet 0     omeprazole (PRILOSEC) 20 MG capsule Take 20 mg by mouth Daily before breakfast.       simvastatin (ZOCOR) 20 MG tablet Take 1 tablet (20 mg total) by mouth at bedtime. 90 tablet 3     sotalol (BETAPACE) 120 MG tablet Take 120 mg by mouth daily.       spironolactone (ALDACTONE) 25 MG tablet Take 25 mg by mouth daily.       No current facility-administered medications on file prior to visit.

## 2021-06-16 NOTE — PROGRESS NOTES
Assessment/Plan:     She presents to clinic for follow-up status post discharge after sustaining strokes and a possible seizure.  She was initiated on Keppra and is doing well.  She is currently on both Plavix and aspirin which will be evaluated by neurology to follow-up appointment. Her blood pressure is well controlled.  Patient is tolerating her new medications well.  She is not having any symptoms of stroke or seizure currently.  She understands that she has a follow-up with neuropsychology which will be scheduled at this appointment.  She seems safe in her living situation currently, although additional supervision may be warranted in the near future.  Patient is already scheduled follow-up for osteoporosis clinic.  As her acute confusion was likely secondary to her known infarct, will allow patient to take trazodone, half dose as needed as previously discussed.      Discontinued Medications:  There are no discontinued medications.    Return to clinic in 8 weeks.    Total time spent with patient was 15 minutes with greater than 50% spent in face-to-face counseling regarding the above plan.    This note has been dictated using voice recognition software. Any grammatical or context distortions are unintentional and inherent to the the software.     Lela Randolph MD  Family Medicine [facility]      Subjective:      Jacklyn Thornton is a 86 y.o. female who presents to clinic for follow up status post admission.  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  At the time she was found to have acute metabolic encephalopathy and a possible complex partial seizure.  She was discharged home after declining home care services.  She was initiated on clopidogrel, and Keppra.  She is encouraged to continue taking aspirin.    Her discharge she has been doing well.  She has an assisted living facility and she has been checked on by  "her neighbors and her nurses.  She is in managing her medications adequately.  She took all of her nighttime meds yesterday morning.  However today she took her meds appropriately per patient report.    She has an osteoporosis clinic appointment scheduled in June.  She has a neurology follow-up in 2 weeks.  She understands she is to have a scheduled appointment with neuropsychology soon.    Old records reviewed:   Discharge summary    Past Medical History, Family History, and Social History reviewed.   History   Smoking Status     Former Smoker   Smokeless Tobacco     Never Used     Comment: \"quit 50 yrs ago\"       Review of systems is as stated in HPI.  The remainder of the 10 system review is otherwise negative.    Objective:     /82  Pulse (!) 59  SpO2 95% There is no height or weight on file to calculate BMI.    Gen: Alert, NAD, appears stated age, normal hygiene, patient is mad at me  Eyes: conjunctivae without injection, sclera clear, EOMI  CV: RRR, no murmur appreciated on further auscultation, originally a slight murmur was possibly appreciated, pedal edema absent bilaterally  Resp: CTAB, no wheezes, rales or ronchi  MSK: grossly full range of motion in all joints, no obvious deformity  Neuro: CN II-XII grossly intact, no deficits in coordination  Psych: no apparent hallucinations or delusions, no pressured speech; alert, oriented x3  SKIN: dry and without lesions  Heme/lymph: no pallor, no active bleeding/bruising, no adenopathy appreciated      Current Outpatient Prescriptions on File Prior to Visit   Medication Sig Dispense Refill     aspirin 81 mg chewable tablet Chew 81 mg daily.       cholecalciferol, vitamin D3, (VITAMIN D3) 2,000 unit cap Take 2,000 Units by mouth daily.       clopidogrel (PLAVIX) 75 mg tablet Take 1 tablet (75 mg total) by mouth daily. 30 tablet 5     FLUoxetine (PROZAC) 10 MG capsule Take 1 capsule (10 mg total) by mouth daily. 90 capsule 3     HYDROcodone-acetaminophen " 5-325 mg per tablet Take 1-2 tablets by mouth every 6 (six) hours as needed for pain. 90 tablet 0     isosorbide mononitrate (IMDUR) 30 MG 24 hr tablet Take 1 tablet (30 mg total) by mouth 2 (two) times a day. 60 tablet 11     levETIRAcetam (KEPPRA) 500 MG tablet Take 1 tablet (500 mg total) by mouth 2 (two) times a day. 60 tablet 3     meloxicam (MOBIC) 7.5 MG tablet Take 7.5 mg by mouth daily.  1     nitroglycerin (NITROSTAT) 0.4 MG SL tablet Place 1 tablet (0.4 mg total) under the tongue every 5 (five) minutes as needed for chest pain. 20 tablet 0     omeprazole (PRILOSEC) 20 MG capsule Take 20 mg by mouth Daily before breakfast.       simvastatin (ZOCOR) 20 MG tablet Take 1 tablet (20 mg total) by mouth at bedtime. 90 tablet 3     sotalol (BETAPACE) 120 MG tablet Take 120 mg by mouth daily.       spironolactone (ALDACTONE) 25 MG tablet Take 25 mg by mouth daily.       traZODone (DESYREL) 50 MG tablet Take 25 mg by mouth at bedtime.       No current facility-administered medications on file prior to visit.

## 2021-06-16 NOTE — PROGRESS NOTES
Assessment/Plan:     Patient presents to clinic for evaluation status post 2 episodes of delirium status post initiation on trazodone.  It is unclear whether or not the trazodone is the causative agent versus unmasking underlying dementia symptoms.  I suspect patient does have an underlying dementia.  We could not perform a Venetie today secondary to time constraints.  Her neuro exam was fairly benign with the notable exception of a failed Romberg, poor balance, and possible dysdiadochokinesia on the left as well as an asymmetric pupil on the right.  -We will stop trazodone, patient is disgruntled  -Discussed the case with an emergency room provider at [facility].  They were comfortable obtaining an MRI today, and will workup for possible stroke  -We will also place a referral for neuropsychological testing as the shadowy figures at the periphery of vision could be an underlying Lewy body dementia or some other dementia symptom    There is some genuine concern about patient living alone and driving at this point.  Patient is adamant about not wanting to go to the hospital but was willing to be convinced provided she did not get admitted.    Discontinued Medications:  Medications Discontinued During This Encounter   Medication Reason     traZODone (DESYREL) 50 MG tablet        Total time spent with patient was 45 minutes with greater than 50% spent in face-to-face counseling regarding the above plan.    This note has been dictated using voice recognition software. Any grammatical or context distortions are unintentional and inherent to the the software.     Lela Randolph MD  Family Medicine [facility]      Subjective:      Jacklyn Thornton is a 86 y.o. female who presents to clinic for evaluation status post 2 unusual events with some cognitive disorientation.    It is suspected that patient has some underlying memory concerns based on family report and my own experience and attempting to explain medications to  "the patient.  However, she seems to have experienced 2 episodes of delirium within the last 2 days.  The only medication changes have been the addition of trazodone, 25 mg, at night which began 2 days ago.    The first episode, patient was disoriented in the midday time.,  Called a family friend about 5 times, and has no recollection of this.  She is adamant and belligerent about the fact that this did not actually occur.  The police and/or EMS were called.  Vital signs were obtained but she refused lab draws or ambulance to the hospital.  This self resolved in several hours.    The next event occurred today, after awakening patient was confused about the TV.  She states the \"colors were all wrong\" and that the \"people were all wrong\".  This also self resolved.    She does state that when she looks out of the corner of her eyes she can see shadowy figures that look like people, but when she looks at them she does not see them.    Patient is accompanied by her daughter and son.    Past Medical History, Family History, and Social History reviewed.   History   Smoking Status     Former Smoker   Smokeless Tobacco     Never Used       Review of systems is as stated in HPI.  The remainder of the 10 system review is otherwise negative.    Objective:     /72  Pulse 60  SpO2 95% There is no height or weight on file to calculate BMI.    Gen: Alert, NAD, appears stated age, normal hygiene   Eyes: conjunctivae without injection, sclera clear, EOMI  ENT/mouth: nares clear, septum midline, absent rhinorrhea, pharyngeal injection absent, neck is supple, no thyroid enlargement  CV: RRR, no murmur appreciated, pedal edema absent bilaterally  Resp: CTAB, no wheezes, rales or ronchi  ABD: normoactive, non-tender to palpation, nondistended  MSK: grossly full range of motion in all joints, no obvious deformity  Neuro: Cranial nerves are intact with the exception of the right pupil.  Left pupils equally round and reactive to " light, right pupil is asymmetric and poorly reactive to light.  No one in the room is certain if this is how her pupil has been previously.  In addition she has intact sensation intact motor functioning, but she had some dysdiadochokinesia on the left and failed the Romberg.  Psych: no apparent hallucinations or delusions, no pressured speech; alert, oriented x3  SKIN: dry and without lesions  Heme/lymph: no pallor, no active bleeding/bruising, no adenopathy appreciated      Current Outpatient Prescriptions on File Prior to Visit   Medication Sig Dispense Refill     aspirin 81 mg chewable tablet Chew 81 mg daily.       cholecalciferol, vitamin D3, (VITAMIN D3) 2,000 unit cap Take 2,000 Units by mouth daily.       FLUoxetine (PROZAC) 10 MG capsule Take 1 capsule (10 mg total) by mouth daily. 90 capsule 3     HYDROcodone-acetaminophen 5-325 mg per tablet Take 1-2 tablets by mouth every 6 (six) hours as needed for pain. 90 tablet 0     isosorbide mononitrate (IMDUR) 30 MG 24 hr tablet Take 1 tablet (30 mg total) by mouth 2 (two) times a day. 60 tablet 11     meloxicam (MOBIC) 7.5 MG tablet Take 7.5 mg by mouth daily.  1     multivitamin therapeutic (THERAGRAN) tablet Take 1 tablet by mouth daily.       nitroglycerin (NITROSTAT) 0.4 MG SL tablet Place 1 tablet (0.4 mg total) under the tongue every 5 (five) minutes as needed for chest pain. 20 tablet 0     omeprazole (PRILOSEC) 20 MG capsule Take 20 mg by mouth Daily before breakfast.       simvastatin (ZOCOR) 20 MG tablet Take 1 tablet (20 mg total) by mouth at bedtime. 90 tablet 3     sotalol (BETAPACE) 120 MG tablet Take 120 mg by mouth daily.       spironolactone (ALDACTONE) 25 MG tablet Take 25 mg by mouth daily.       [DISCONTINUED] traZODone (DESYREL) 50 MG tablet Take 0.5 tablets (25 mg total) by mouth at bedtime. 90 tablet 3     No current facility-administered medications on file prior to visit.

## 2021-06-16 NOTE — TELEPHONE ENCOUNTER
Telephone Encounter by Annette Chandler LPN at 1/13/2020  4:08 PM     Author: Annette Chandler LPN Service: -- Author Type: Licensed Nurse    Filed: 1/13/2020  4:11 PM Encounter Date: 1/10/2020 Status: Signed    : Annette Chandler LPN (Licensed Nurse)       Patient Returning Call  Reason for call:  [facility]  Information relayed to patient:  Lela Randolph MD           12:50 PM   Note      The concurrent use of an SSRI and an NSAID can increase the risk of bleeding.  However, patient is already on Eliquis, and was previously on Eliquis and Plavix simultaneously.  I suspect the risk of bleeding is significantly higher from these medications.  I defer to cardiology on the use of Plavix concurrently with Eliquis.  Otherwise, I see no concerns with patient's current medication list.      Patient has additional questions:  Yes  If YES, what are your questions/concerns:  Caller is requesting to fax above message as an order per caller patient is admitted to there facility need s to give her medications requesting to send fax [phone] as soon as possible.  Okay to leave a detailed message?: No

## 2021-06-16 NOTE — TELEPHONE ENCOUNTER
Telephone Encounter by Vanessa Lewis RN at 1/14/2020 11:01 AM     Author: Vnaessa Lewis RN Service: -- Author Type: Registered Nurse    Filed: 1/14/2020 11:11 AM Encounter Date: 1/13/2020 Status: Signed    : Vanessa Lewis RN (Registered Nurse)       Reached out to Yoly with [facility]: Discontinue clopidogrel.  Per Dr. Singh's clinic note 11/22/19:  Plan:    Initiate apixaban 5 mg twice daily.    Discontinue clopidogrel.  Clinic note faxed to [phone].  Janelle Charles Kelly, RN   Caller: Unspecified (Today, 10:37 AM)             General phone call:   Caller: Milka   Primary cardiologist: Dr. Singh   Detailed reason for call: Milka states Jacklyn has discontinued Plavix but they needs a signed order to discontinue. Please fax to [phone]. Also, please call Milka, she can accept a verbal while waiting for the fax.     Best phone number: [phone], Milka     Best time to contact: Today

## 2021-06-16 NOTE — PROGRESS NOTES
Assessment/Plan:     Patient presented late to her appointment, was disorganized thinking, and had trouble remembering simple statements about her care.  The discussion about what medication she was and was not taking took about 20 minutes.  Patient was still confused by the end of the conversation.    1. Mild episode of recurrent major depressive disorder  We will discontinue sertraline and initiate on fluoxetine  Patient denied suicidal ideation, but does have significant thoughts that she would be better off dead.  We will continue to monitor on this new medication.    2. Hypertension  Continue all current medication, continue blood pressure log, will bring blood pressure cuff in for calibration at next appointment.  Continue spironolactone, sotalol, and twice daily Imdur.  We will likely need to change his medications again, but I fear that significant medication changes will cause massive confusion for the patient.    3. Screening for osteoporosis  This is previously ordered patient never obtained the image.  She would like to be contacted again.  - DXA Bone Density Scan; Future    4. Primary insomnia  Another extensive discussion with patient about the importance of sleep hygiene.  Encouraged her to follow my advice thoroughly this time.  Encouraged her to  the melatonin and to try that before the prescription of trazodone is written.  Patient is amenable, but I suspect poor follow-through.    5. Memory deficit  Patient had significant difficulty following the conversation, did not understand what medications have been added or removed, after repeating myself several times and showing her on a handout what we had changed, patient still had some concerns.  I suspect there is significant medication noncompliance.  Encourage patient to bring in her medication box and we will address these memory concerns at the next appointment with a Mini-Mental Status Examination.  Patient will likely need a [place] in  the future.  I do not see of yet feel that she is a danger to be living at home.    Ref to healthcare home declined    Discontinued Medications:  Medications Discontinued During This Encounter   Medication Reason     sertraline (ZOLOFT) 50 MG tablet    Patient will likely discontinue simvastatin after an extensive discussion about the risks and benefits of this medication, she is trying to decrease her polypharmacy and as no studies have indicated any evidence of efficacy an 86-year-old I feel it is acceptable to discontinue    AVS printed and given to patient.  Return to clinic in 2 weeks.    The following are part of a depression follow up plan for the patient:  mental health care management    Total time spent with patient was 35 minutes with greater than 50% spent in face-to-face counseling regarding the above plan.    This note has been dictated using voice recognition software. Any grammatical or context distortions are unintentional and inherent to the the software.     Lela Randolph MD  [facility]      Subjective:      Jacklyn Thornton is a 86 y.o. female who presents to clinic for blood pressure concerns and insomnia.    Patient previously filled out an incomplete PHQ 9 but indicated a score of 2 with respect to how frequently she thought about thoughts of harming herself or that the weather better off if she were dead.  When queried, patient states that she does not have a plan and has no thoughts of hurting herself, but she does occasionally feel he could be easier if she does did not wake up in the morning.  She is currently on sertraline 50 mg once daily, but does not notice any improvement on this medication.    Patient also still struggles with insomnia.  She followed very few of the advice bullets that we had discussed at last appointment.  She continues to go to bed after midnight and a fall asleep after 2 AM.  She never picked up the melatonin although she thought that she  "had.  She is trying to get up when she cannot sleep and do something else for little while, but I suspect she still tossing and turning in bed.  She attempted to eliminate naps without success.    Patient also has significant hypertension.  She keeps a blood pressure log but the blood pressures are dramatically different day-to-day.  The most recent ones are  164/94, 153/79, 121/64, 146/78 (this last one was done this morning, and her blood pressure today is 116/74).  Patient states she never misses a dose of medication but occasionally takes them late.  He is now on the Imdur twice daily as she had been when she was first seen by me.      Old records reviewed:   Previous note    Past Medical History, Family History, and Social History reviewed.  Since has been passed away 17 years ago.  History   Smoking Status     Former Smoker   Smokeless Tobacco     Never Used       Review of systems is as stated in HPI.  The remainder of the 10 system review is otherwise negative.    Objective:     /74  Pulse 63  Ht 4' 11\" (1.499 m)  SpO2 95% There is no height or weight on file to calculate BMI.    Gen: Alert, NAD, appears stated age, normal hygiene, walks with cane  Eyes: conjunctivae without injection, sclera clear, EOMI  ENT/mouth: nares clear, septum midline, absent rhinorrhea, pharyngeal injection absent, neck is supple, no thyroid enlargement  CV: RRR, no murmur appreciated, pedal edema absent bilaterally  Resp: CTAB, no wheezes, rales or ronchi  ABD: normoactive, non-tender to palpation, nondistended  MSK: grossly full range of motion in all joints, no obvious deformity  Neuro: CN II-XII grossly intact, no deficits in coordination  Psych: no apparent hallucinations or delusions, no pressured speech; alert, oriented x3; disorganized thinking  SKIN: dry and without lesions  Heme/lymph: no pallor, no active bleeding/bruising, no adenopathy appreciated      Current Outpatient Prescriptions on File Prior to Visit "   Medication Sig Dispense Refill     aspirin 81 mg chewable tablet Chew 81 mg daily.       cholecalciferol, vitamin D3, (VITAMIN D3) 2,000 unit cap Take 2,000 Units by mouth daily.       HYDROcodone-acetaminophen 5-325 mg per tablet Take 1-2 tablets by mouth every 6 (six) hours as needed for pain. 90 tablet 0     isosorbide mononitrate (IMDUR) 30 MG 24 hr tablet Take 1 tablet (30 mg total) by mouth 2 (two) times a day. 60 tablet 11     meloxicam (MOBIC) 7.5 MG tablet Take 7.5 mg by mouth daily.  1     multivitamin therapeutic (THERAGRAN) tablet Take 1 tablet by mouth daily.       nitroglycerin (NITROSTAT) 0.4 MG SL tablet Place 1 tablet (0.4 mg total) under the tongue every 5 (five) minutes as needed for chest pain. 20 tablet 0     omeprazole (PRILOSEC) 20 MG capsule Take 20 mg by mouth Daily before breakfast.       simvastatin (ZOCOR) 20 MG tablet Take 1 tablet (20 mg total) by mouth at bedtime. 90 tablet 3     sotalol (BETAPACE) 120 MG tablet Take 120 mg by mouth daily.       spironolactone (ALDACTONE) 25 MG tablet Take 25 mg by mouth daily.       [DISCONTINUED] sertraline (ZOLOFT) 50 MG tablet Take 50 mg by mouth daily.       No current facility-administered medications on file prior to visit.
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
## 2021-06-17 NOTE — TELEPHONE ENCOUNTER
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    Thankyou!  Emily Ortega (daughter)

## 2021-06-17 NOTE — PATIENT INSTRUCTIONS - HE
Patient Instructions by Lela Randolph MD at 10/24/2019  9:50 AM     Author: Lela Randolph MD Service: -- Author Type: Physician    Filed: 10/24/2019 10:30 AM Encounter Date: 10/24/2019 Status: Signed    : Lela Randolph MD (Physician)         Patient Education     Living with Atrial Fibrillation: Preventing Stroke  Atrial fibrillation (AFib) is the most common abnormal heart rhythm in the world. The heart has 2 upper chambers called atria and 2 lower chambers called ventricles. AFib causes the atria to quiver (fibrillate) instead of pumping normally. Blood can then pool in the heart instead of moving in and out as usual. This can cause blood clots to form in the heart. A clot can break free, travel to the brain and cause a stroke. A stroke can quickly damage the brain.    Taking medicine to prevent stroke  Your healthcare provider may prescribe a medicine to help prevent blood clots. This type of medicine is called a blood thinner. Blood thinners include:    Antiplatelet medicines, such as aspirin or clopidrogrel    Anticoagulant medicines, such as warfarin, or medicines called direct-acting oral anticoagulants (DOAC). These include dabigatran, rivaroxaban, apixaban, and edoxaban.  Risks of blood thinner medicine  Blood thinners raise your risk of bleeding. If you take certain blood thinners, you may need to take extra steps to stay healthy. Depending on the blood thinner, you may need regular blood tests to check the levels of medicine in your blood. Youll need to be careful not to injure yourself. And you may need to watch your diet for foods that affect blood clotting.  If your blood is too thin, you may have symptoms of excess bleeding, such as:    Unusual bruising    Bleeding from the gums    Blood in the urine or stool    Black stools    Vomiting blood    Nosebleeds    An unusual or severe headache  Taking the right dose  Take the medicine exactly as directed by your healthcare  provider. Take it at the same time each day. If you miss a dose, call your provider right away to find out how much to take. Never take a double dose. If you take too much, it can cause too much bleeding. It can cause bleeding you can see on the outside of your body. It can also cause bleeding on the inside of your body that you may not be aware of.  Getting your blood tested  If you take DOACs, you don't need frequent blood tests. But, you may need monitoring of your kidneys. Your healthcare provider will discuss this with you in detail.   If you take warfarin, you will need to have your blood tested on a regular schedule. This is to make sure you dont have too much or too little of the medicine in your blood. Too much can cause excess bleeding. Too little may not prevent blood clots from harming you.  You may need to visit a hospital or clinic every week to have your blood tested. Or a nurse may come to your home and test your blood. In some cases, you may be able to test your blood at home yourself with a small machine. Talk with your healthcare provider to find out whats best for you. After the blood test, your healthcare provider may tell you to change your dose of medicine.  Watching your diet  Warfarin levels are sensitive to your diet. For example, many foods contain vitamin K. Vitamin K helps your blood clot. You dont need to avoid foods that have vitamin K. But you do need to keep the amount of them you eat steady as possible from day to day. Examples of foods high in vitamin K are asparagus, avocado, broccoli, cabbage, kale, spinach, and some other leafy green vegetables. Oils, such as soybean, canola, and olive, are also high in vitamin K.  Other foods and drinks can affect the way blood thinners work in your body. These include:    Grapefruit and grapefruit juice    Cranberries and cranberry juice    Fish oil supplements    Garlic, no, licorice, and turmeric    Herbs used in herbal teas or  supplements    Alcohol  If any of these items are part of your regular diet, continue using them as you normally would. Dont make any big changes in your diet without first talking with your healthcare provider.  You may also need to limit fats in your diet to 2 to 4 tablespoons a day.  Preventing injury  Because blood thinners make you bleed more, youll need to protect yourself from breaks in the skin. Follow these guidelines:    Don't go barefoot. Always wear shoes.    Don't trim corns or calluses yourself.    Use an electric razor instead of a manual one.    Use a soft-bristled toothbrush and waxed dental floss.  Youll also need to avoid any activities that may cause injury. If you fall or are injured, you could be bleeding inside your body and not know it. Get medical care right away if you fall, hit your head, or have any other kind of injury.  Other safety tips  While on your medicine:    Tell all of your healthcare providers that you take a blood thinner for AFib. This includes all of your doctors, dental care providers, and your pharmacist.    Ask your doctor before taking any new medicines, vitamins, or other supplements. Any of these can cause problems when you take a blood thinner.    Wear a medical alert bracelet or carry an ID card in your wallet if you will be taking blood thinners for months or longer.    Keep all appointments for your blood tests.  Procedures to prevent stroke  Most blood clots that form in the heart occur in a pouch of the left atrium called the appendage. This pouch can often be large and have multiple lobes. Blood often pools and form clots here. Left atrial appendage closure is a nonsurgical procedure in which a plug is placed at the opening of the left atrial appendage. This closes it off from the rest of the heart. Once the plug has sealed, no blood can enter or leave the pouch. This reduces blood clot formation and stroke risk. Ask your doctor if you qualify for this type of  procedure.   Other ways to help prevent stroke  Your healthcare provider might give you other advice about how to lower your risk for stroke. Tips include:    Lower your cholesterol with lifestyle changes or medicine    Don't smoke    Get physical activity    Lose weight if needed    Eat a heart-healthy diet    Don't drink too much alcohol  When to call your healthcare provider  Call your healthcare provider right away if you have any of these:    Unusual or severe headache    Confusion, weakness, or numbness    Loss of vision    Trouble with speech    Bleeding that wont stop    Coughing or vomiting blood    Bright red blood or black tar in the stool    Fall or injury to the head    Symptoms of AFib that are new or getting worse  Date Last Reviewed: 5/1/2016 2000-2019 [employer]. [address] [place] [address], [place] [address]. All rights reserved. This information is not intended as a substitute for professional medical care. Always follow your healthcare professional's instructions.

## 2021-06-18 NOTE — LETTER
Letter by Lela Randolph MD at      Author: Lela Randolph MD Service: -- Author Type: --    Filed:  Encounter Date: 1/15/2019 Status: (Other)         [facility]/OB  01/15/19    Patient: Jacklyn Thornton  YOB: 1931  Medical Record Number: [id number]                                                                  Opioid / Opioid Plus Controlled Substance Agreement    I understand that my care provider has prescribed an opioid (narcotic) controlled substance to help manage my condition(s). I am taking this medicine to help me function or work. I know this is strong medicine, and that it can cause serious side effects. Opioid medicine can be sedating, addicting and may cause a dependency on the drug. They can affect my ability to drive or think, and cause depression. They need to be taken exactly as prescribed. Combining opioids with certain medicines or chemicals (such as cocaine, sedatives and tranquilizers, sleeping pills, meth) can be dangerous or even fatal. Also, if I stop opioids suddenly, I may have severe withdrawal symptoms. Last, I understand that opioids do not work for all types of pain nor for all patients. If not helpful, I may be asked to stop them.        The risks, benefits, and side effects of these medicine(s) were explained to me. I agree that:    1. I will take part in other treatments as advised by my care team. This may be psychiatry or counseling, physical therapy, behavioral therapy, group treatment or a referral to a pain clinic. I will reduce or stop my medicine when my care team tells me to do so.  2. I will take my medicines as prescribed. I will not change the dose or schedule unless my care team tells me to. There will be no refills if I run out early.  I may be contactedwithout warning and asked to complete a urine drug test or pill count at any time.   3. I will keep all my appointments, and understand this is part of the monitoring of opioids. My  care team may require an office visit for EVERY opioid/controlled substance refill. If I miss appointments or dont follow instructions, my care team may stop my medicine.  4. I will not ask other providers to prescribe controlled substances, and I will not accept controlled substances from other people. If I need another prescribed controlled substance for a new reason, I will tell my care team within 1 business day.  5. I will use one pharmacy to fill all of my controlled substance prescriptions, and it is up to me to make sure that I do not run out of my medicines on weekends or holidays. If my care team is willing to refill my opioid prescription without a visit, I must request refills only during office hours, refills may take up to 3 days to process, and it may take up to 5 to 7 days for my medicine to be mailed and ready at my pharmacy. Prescriptions will not be mailed anywhere except my pharmacy.        [id number]  Rev 12/18         Registration to scan to EHR                             Page 1 of 2               Controlled Substance Agreement Opioid        [facility] MEDICINE/OB  01/15/19  Patient: Jacklyn Thornton  YOB: 1931  Medical Record Number: [id number]                                                                  6. I am responsible for my prescriptions. If the medicine/prescription is lost or stolen, it will not be replaced. I also agree not to share controlled substance medicines with anyone.  7. I agree to not use ANY illegal or recreational drugs. This includes marijuana, cocaine, bath salts or other drugs. I agree not to use alcohol unless my care team says I may.          I agree to give urine samples whenever asked. If I dont give a urine sample, the care team may stop my medicine.    8. If I enroll in the [place] Medical Marijuana program, I will tell my care team. I will also sign an agreement to share my medical records with my care team.   9. I will bring in my list of  medicines (or my medicine bottles) each time I come to the clinic.   10. I will tell my care team right away if I become pregnant or have a new medical problem treated outside of my regular clinic.  11. I understand that this medicine can affect my thinking and judgment. It may be unsafe for me to drive, use machinery and do dangerous tasks. I will not do any of these things until I know how the medicine affects me. If my dose changes, I will wait to see how it affects me. I will contact my care team if I have concerns about medicine side effects.    I understand that if I do not follow any of the conditions above, my prescriptions or treatment may be stopped.      I agree that my provider, clinic care team, and pharmacy may work with any city, state or federal law enforcement agency that investigates the misuse, sale, or other diversion of my controlled medicine. I will allow my provider to discuss my care with or share a copy of this agreement with any other treating provider, pharmacy or emergency room where I receive care. I agree to give up (waive) any right of privacy or confidentiality with respect to these consents.     I have read this agreement and have asked questions about anything I did not understand.      ________________________________________________________________________  Patient signature - Date/Time -  Jacklyn Thornton                                      ________________________________________________________________________  Witness signature                                                            ________________________________________________________________________  Provider signature - Lela Randolph MD      [id number]  Rev 12/18         Registration to scan to EHR                         Page 2 of 2                   Controlled Substance Agreement Opioid           Page 1 of 2  Opioid Pain Medicines (also known as Narcotics)  What You Need to Know    What are opioids?   Opioids are  pain medicines that must be prescribed by a doctor.  They are also known as narcotics.    Examples are:     morphine (MS Contin, Luz)    oxycodone (Oxycontin)    oxycodone and acetaminophen (Percocet)    hydrocodone and acetaminophen (Vicodin, Norco)     fentanyl patch (Duragesic)     hydromorphone (Dilaudid)     methadone     What do opioids do well?   Opioids are best for short-term pain after a surgery or injury. They also work well for cancer pain. Unlike other pain medicines, they do not cause liver or kidney failure or ulcers. They may help some people with long-lasting (chronic) pain.     What do opioids NOT do well?   Opioids never get rid of pain entirely, and they do not work well for most patients with chronic pain. Opioids do not reduce swelling, one of the causes of pain. They also dont work well for nerve pain.                           For informational purposes only.  Not to replace the advice of your care provider.  Copyright 201 [facility]. All right reserved. [employer] 824449-Fut 02/18.      Page 2 of 2    Risks and side effects   Talk to your doctor before you start or decide to keep taking one of these medicines. Side effects include:    Lowering your breathing rate enough to cause death    Overdose, including death, especially if taking higher than prescribed doses    Long-term opioid use    Worse depression symptoms; less pleasure in things you usually enjoy    Feeling tired or sluggish    Slower thoughts or cloudy thinking    Being more sensitive to pain over time; pain is harder to control    Trouble sleeping or restless sleep    Changes in hormone levels (for example, less testosterone)    Changes in sex drive or ability to have sex    Constipation    Unsafe driving    Itching and sweating    Feeling dizzy    Nausea, vomiting and dry mouth    What else should I know about opioids?  When someone takes opioids for too long or too often, they become dependent. This means that  if you stop or reduce the medicine too quickly, you will have withdrawal symptoms.    Dependence is not the same as addiction. Addiction is when people keep using a substance that harms their body, their mind or their relations with others. If you have a history of drug or alcohol abuse, taking opioids can cause a relapse.    Over time, opioids dont work as well. Most people will need higher and higher doses. The higher the dose, the more serious the side effects. We dont know the long-term effects of opioids.      Prescribed opioids aren't the best way to manage chronic pain    Other ways to manage pain include:      Ibuprofen or acetaminophen.  You should always try this first.      Treat health problems that may be causing pain.      acupuncture or massage, deep breathing, meditation, visual imagery, aromatherapy.      Use heat or ice at the pain site      Physical therapy and exercise      Stop smoking      See a counselor or therapist                                                  People who have used opioids for a long time may have a lower quality of life, worse depression, higher levels of pain and more visits to doctors.    Never share your opioids with others. Be sure to store opioids in a secure place, locked if possible.Young children can easily swallow them and overdose.     You can overdose on opioids.  Signs of overdose include decrease or loss of consciousness, slowed breathing, trouble waking and blue lips.  If someone is worried about overdose, they should call 911.    If you are at risk for overdose, you may get naloxone (Narcan, a medicine that reverses the effects of opioids.  If you overdose, a friend or family member can give you Narcan while waiting for the ambulance.  They need to know the signs of overdose and how to give Narcan.    While you're taking opioids:    Don't use alcohol or street drugs. Taking them together can cause death.    Don't take any of these medicines unless your  doctor says its okay.  Taking these with opioids can cause death.    Benzodiazepines (such as lorazepam         or diazepam)    Muscle relaxers (such as cyclobenzaprine)    sleeping pills    other opioids    Safe disposal of opioids  Find your area drug take-back program, your pharmacy mail-back program, buy a special disposal bag (such as Deterra) from your pharmacy or flush them down the toilet.  Use the guidelines at:  www.fda.gov/drugs/resourcesforyou

## 2021-06-19 NOTE — LETTER
Letter by Connie Singh MD at      Author: Connie Singh MD Service: -- Author Type: --    Filed:  Encounter Date: 8/27/2019 Status: (Other)         Jacklyn Thornton  [id number]  [facility] [address]      August 27, 2019      Dear Jacklyn,    This letter is to remind you that you will be due for your follow up appointment with Dr. Ralph Singh  . To help ensure you are in the best health possible, a regular follow-up with your cardiologist is essential.     Please call our Patient Scheduling Line at [phone] to schedule your appointment at your earliest convenience.  If you have recently scheduled an appointment, please disregard this letter.    We look forward to seeing you again. As always, we are available at the number  above for any questions or concerns you may have.      Sincerely,     The Physicians and Staff of [facility] [place]

## 2021-06-19 NOTE — LETTER
Letter by Joanne Mccain PharmD at      Author: Joanne Mccain, Ulises Service: -- Author Type: --    Filed:  Encounter Date: 2019 Status: (Other)             2019      Jacklyn Thornton  [address]  [facility] [address]            Dear Jacklyn Thornton     Thank you for talking with me on 19 about your health and medications. [facility] MTM (Medication Therapy Management) program helps you understand your medications and use them safely.    Along with this letter are an action plan (Medication Action Plan) and a medication list (Personal Medication List). The action plan has steps you should take to help you get the best results from your medications. The medication list will help you keep track of your medications and how to use them the right way.       Have your action plan and medication list with you when you talk with your doctors, pharmacists, and other health care providers.    Ask your doctors, pharmacists, and other healthcare providers to update them at every visit.     Take your medication list with you if you go to the hospital or emergency room.     Give a copy of the action plan and medication list to your family or caregivers.     If you want to talk about this letter or any of the papers with it, please call [phone]. We look forward to working with you, your doctors, and other healthcare providers to help you stay healthy.    Sincerely,     Joanne Mccain    _  Medication Action Plan For Jacklyn Thornton, : 1931     This action plan will help you get the best results from your medications if you:     1. Read What we talked about.   2. Take the steps listed in the What I need to do boxes.   3. Fill in What I did and when I did it.   4. Fill in My follow-up plan and Questions I want to ask.     Have this action plan with you when you talk with your doctors, pharmacists, and other healthcare providers in your care team. Share this with your family or caregivers too.     Date Prepared: 5/9/2019      What we talked about:  Sotalol is a medication for your atrial fibrillation. You have not been taking this.      What I need to do:  Restart sotalol 40 mg two times a day. What I did and when I did it:          What we talked about:  When we spoke on the phone, your mood was poor and you were having bad thoughts.      What I need to do:  Increase fluoxetine to 40 mg daily. This is for your mood.    What I did and when I did it:          What we talked about:       What I need to do:       What I did and when I did it:         My follow-up plan (add notes about next steps):            Questions I want to ask (include topics about medications or therapy):          If you have any questions about your action plan, call Joanne Mccain at [phone], Monday-Friday 8:00-4:30pm  _  This medication list was made for you after we talked. We also used information from your doctors chart.      Use blank rows to add new medications. Then fill in the dates you started using them.     Cross out medications when you no longer use them. Then write the date and why you stopped using them.     Ask your doctors, pharmacists, and other healthcare providers to update this list at every visit.  Keep this list up-to-date with:  ? prescription medications  ? over the counter drugs  ? herbals  ? vitamins  ? minerals          If you go to the hospital or emergency room, take this list with you. Share this with your family or caregivers too.    Date Prepared: 5/9/2019      Allergies or side effects:       Medication: cholecalciferol, vitamin D3, 1,000 unit tablet      How I use it: Take 1,000 Units by mouth daily.      Why I use it: Vitamin D    Prescriber: Lela Randolph MD      Date I started using it:     Date I stopped using it:       Why I stopped using it:          Medication: clopidogrel (PLAVIX) 75 mg tablet      How I use it: Take 1 tablet (75 mg total) by mouth daily.      Why I use it:  Subcortical Infarction (H)    Prescriber: Lela Randolph MD      Date I started using it:     Date I stopped using it:       Why I stopped using it:          Medication: FLUoxetine (PROZAC) 40 MG capsule      How I use it: Take 1 capsule (40 mg total) by mouth daily.      Why I use it: Mild episode of recurrent major depressive disorder     Prescriber: Lela Randolph MD      Date I started using it:     Date I stopped using it:       Why I stopped using it:          Medication: HYDROcodone-acetaminophen 5-325 mg per tablet      How I use it: Take 1-2 tablets by mouth every 6 (six) hours as needed for pain.      Why I use it: Adhesive capsulitis of left shoulder    Prescriber: Lela Randolph MD      Date I started using it:     Date I stopped using it:       Why I stopped using it:          Medication: isosorbide mononitrate (IMDUR) 30 MG 24 hr tablet      How I use it: Take 1 tablet (30 mg total) by mouth 2 (two) times a day.      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      Why I use it: Pain    Prescriber: Lela Randolph MD      Date I started using it:     Date I stopped using it:       Why I stopped using it:          Medication: nitroglycerin (NITROSTAT) 0.4 MG SL tablet      How I use it: Place 1 tablet (0.4 mg total) under the tongue every 5 (five) minutes as needed for chest pain.      Why I use  it: Chest Pain    Prescriber: Lela Randolph MD      Date I started using it:     Date I stopped using it:       Why I stopped using it:          Medication: omeprazole (PRILOSEC) 20 MG capsule      How I use it: TAKE ONE CAPSULE BY MOUTH ONE TIME DAILY      Why I use it: GERD (Gastroesophageal Reflux Disease)    Prescriber: Nikkie Lance MD      Date I started using it:     Date I stopped using it:       Why I stopped using it:          Medication: simvastatin (ZOCOR) 20 MG tablet      How I use it: TAKE 1 TABLET BY MOUTH EVERYDAY AT BEDTIME      Why I use it: Hyperlipidemia    Prescriber: Lela Randolph MD      Date I started using it:     Date I stopped using it:       Why I stopped using it:          Medication: sotalol (BETAPACE) 80 MG tablet      How I use it: Take 0.5 tablets (40 mg total) by mouth 2 (two) times a day.      Why I use it: Chest Pain    Prescriber: Connie Singh MD      Date I started using it:     Date I stopped using it:       Why I stopped using it:          Medication: spironolactone (ALDACTONE) 25 MG tablet      How I use it: TAKE ONE TABLET BY MOUTH ONE TIME DAILY      Why I use it: Coronary Atherosclerosis    Prescriber: Lela Randolph MD      Date I started using it:     Date I stopped using it:       Why I stopped using it:          Medication:       How I use it:       Why I use it:     Prescriber:       Date I started using it:     Date I stopped using it:       Why I stopped using it:          Medication:       How I use it:       Why I use it:     Prescriber:       Date I started using it:     Date I stopped using it:       Why I stopped using it:          Medication:       How I use it:       Why I use it:     Prescriber:       Date I started using it:     Date I stopped using it:       Why I stopped using it:          Other Information:      If you have any questions about your medication list, call [phone]    According to the Paperwork  Reduction Act of 1995, no persons are required to respond to a collection of information unless it displays a valid OMB control number. The valid OMB number for this information collection is [id number]. The time required to complete this information collection is estimated to average 37.76 minutes per response, including the time to review instructions, searching existing data resources, gather the data needed, and complete and review the information collection. If you have any comments concerning the accuracy of the time estimate(s) or suggestions for improving this form, please write to: CMS, Attn: RUTH Reports Clearance Officer, [address] [place] [phone].

## 2021-06-20 NOTE — PROGRESS NOTES
"TCM DISCHARGE FOLLOW UP CALL    Discharge Date:  9/15/2018  Reason for hospital stay (discharge diagnosis)::  Chest Pain  Are you feeling better, the same or worse since your discharge?:  Patient is feeling better  Do you feel like you have a plan in the event of a health emergency?: Yes (\"Call my daughter, and she does whatever I tell her depending on how I feel.\"  Pt aware of 24/7 nurse triage services through clinic.)    As part of your discharge plan, were  home care services ordered for you?: No    Did you receive any new medications, or was there a change to your medications?: Yes    Are you taking those medications, or do you have any established regiment?:  Yes - Pt has decreased her sotalol to 80 mg daily & confirmed she has stopped Ibuprofen.  Do you have any follow up visits scheduled with your PCP or Specialist?:  Yes, with PCP (Dr. Randolph 9/21/18)  (RN) Is PCP appt scheduled soon enough (within 14 days of discharge date)?: Yes    RN NOTES::  Pt will call [facility] to schedule a f/u w/Dr. Singh for sometime in the next 10-14 days.      Hanna Hernandez RN Care Manager, Population Health    "

## 2021-06-20 NOTE — LETTER
Letter by Lela Randolph MD at      Author: Lela Randolph MD Service: -- Author Type: --    Filed:  Encounter Date: 1/10/2020 Status: Signed         January 14, 2020     Patient: Jacklyn Thornton   YOB: 1931           To Whom It May Concern:    The concurrent use of an SSRI and an NSAID can increase the risk of bleeding.  However, patient is already on Eliquis, and was previously on Eliquis and Plavix simultaneously.  I suspect the risk of bleeding is significantly higher from these medications.  I defer to cardiology on the use of Plavix concurrently with Eliquis.  Otherwise, I see no concerns with patient's current medication list.    If you have any questions or concerns, please don't hesitate to call.    Sincerely,        Electronically signed by Lela Randolph MD

## 2021-06-23 NOTE — TELEPHONE ENCOUNTER
Controlled Substance Refill Request  Medication Name:   Requested Prescriptions     Pending Prescriptions Disp Refills     HYDROcodone-acetaminophen 5-325 mg per tablet 30 tablet 0     Sig: Take 1-2 tablets by mouth every 6 (six) hours as needed for pain.     Date Last Fill: 1/10/2019  Pharmacy: [id number]      Submit electronically to pharmacy  Controlled Substance Agreement Date Scanned:   Encounter-Level CSA Scan Date - 07/27/2017:    Scan on 8/3/2017  2:28 PM (below)             Encounter-Level CSA Scan Date - 02/22/2016:    Scan on 2/29/2016 11:07 AM: Hydrocodone (below)         Last office visit with prescriber/PCP: 1/15/2019 Lela Randolph MD OR same dept: 1/15/2019 Lela Randolph MD OR same specialty: 1/15/2019 Lela Randolph MD  Last physical: Visit date not found Last MTM visit: Visit date not found

## 2021-06-23 NOTE — TELEPHONE ENCOUNTER
Controlled Substance Refill Request  Medication:   Requested Prescriptions     Pending Prescriptions Disp Refills     HYDROcodone-acetaminophen 5-325 mg per tablet 30 tablet 0     Sig: Take 1-2 tablets by mouth every 6 (six) hours as needed for pain.     Date Last Fill: 11/23/18  Pharmacy: [facility]    Submit electronically to pharmacy    Controlled Substance Agreement on File:   Encounter-Level CSA Scan Date - 07/27/2017:    Scan on 8/3/2017  2:28 PM (below)             Encounter-Level CSA Scan Date - 02/22/2016:    Scan on 2/29/2016 11:07 AM: Hydrocodone (below)           Last office visit: 3/23/2018 Lela Randolph MD

## 2021-06-23 NOTE — PATIENT INSTRUCTIONS - HE
Current medications:    Current Outpatient Medications on File Prior to Visit   Medication Sig Dispense Refill     aspirin 81 mg chewable tablet Chew 81 mg daily.       cholecalciferol, vitamin D3, (VITAMIN D3) 2,000 unit cap Take 2,000 Units by mouth daily.       clopidogrel (PLAVIX) 75 mg tablet Take 1 tablet (75 mg total) by mouth daily. 30 tablet 5     HYDROcodone-acetaminophen 5-325 mg per tablet Take 1-2 tablets by mouth every 6 (six) hours as needed for pain. 30 tablet 0     isosorbide mononitrate (IMDUR) 30 MG 24 hr tablet Take 1 tablet (30 mg total) by mouth 2 (two) times a day. 180 tablet 3     levETIRAcetam (KEPPRA) 500 MG tablet Take 1 tablet (500 mg total) by mouth 2 (two) times a day. 180 tablet 0     nitroglycerin (NITROSTAT) 0.4 MG SL tablet Place 1 tablet (0.4 mg total) under the tongue every 5 (five) minutes as needed for chest pain. 20 tablet 0     omeprazole (PRILOSEC) 20 MG capsule Take 20 mg by mouth Daily before breakfast.       simvastatin (ZOCOR) 20 MG tablet TAKE 1 TABLET (20 MG TOTAL) BY MOUTH AT BEDTIME. 90 tablet 2     sotalol (BETAPACE) 80 MG tablet Take 1 tablet (80 mg total) by mouth daily. 30 tablet 0     spironolactone (ALDACTONE) 25 MG tablet TAKE ONE TABLET BY MOUTH ONE TIME DAILY 90 tablet 3     [DISCONTINUED] FLUoxetine (PROZAC) 10 MG capsule Take 1 capsule (10 mg total) by mouth daily. 90 capsule 3     [DISCONTINUED] meloxicam (MOBIC) 7.5 MG tablet TAKE 1 TABLET (7.5 MG TOTAL) BY MOUTH DAILY. 90 tablet 1     [DISCONTINUED] sertraline (ZOLOFT) 50 MG tablet Take 50 mg by mouth daily.       [DISCONTINUED] sotalol (BETAPACE) 120 MG tablet Take 120 mg by mouth daily.  3     [DISCONTINUED] traZODone (DESYREL) 50 MG tablet Take 1 tablet by mouth at bedtime as needed.       No current facility-administered medications on file prior to visit.

## 2021-06-23 NOTE — TELEPHONE ENCOUNTER
----- Message from Lela Randolph MD sent at 1/24/2019  4:31 PM CST -----  Regarding: FW: medication question  Please call patient and let her know that I spoke to her cardiologist and he recommended discontinueing the aspirin but keeping the clopidogrel.  ----- Message -----  From: Connie Singh MD  Sent: 1/24/2019   1:10 PM  To: Lela Randolph MD  Subject: RE: medication question                          If you think that she needs to be on clopidogrel given prior neuro event, then do not feel she necessarily needs to be on both clopidogrel and aspirin and can discontinue aspirin.  ----- Message -----  From: Lela Randolph MD  Sent: 1/21/2019   9:55 AM  To: Connie Singh MD  Subject: RE: medication question                          Dear Ralph,    So she has a history of CAD and a right subcortical infarction. That's where my concern with just discontinuing came in, but I also want to decrease polypharmacy whenever possible. Thoughts?  - Melba  ----- Message -----  From: Connie Singh MD  Sent: 1/21/2019   9:49 AM  To: Lela Randolph MD  Subject: RE: medication question                          Hi Lela,  Upon reviewing my last note, it would seem that clopidogrel was not on her medication list when last seen by me.  From a cardiac standpoint, do not feel she needs to be on both clopidogrel and aspirin.  Unless there is some other reason for her to be on clopidogrel (prior history of TIA/stroke and the like), feel we can simply have her on aspirin 81 mg daily.  Thanks.  Ralph    ----- Message -----  From: Lela Randolph MD  Sent: 1/15/2019   1:31 PM  To: Connie Singh MD  Subject: medication question                              Dear Dr. Singh,    We share this patient.  I was reviewing her medications and was wondering how you felt about having the patient on both aspirin and Plavix.  I am very comfortable continuing this regimen, but I  am trying to decrease polypharmacy in every way possible.  If you think it is essential she remain on both of these medications, I will let the patient know and we will make no changes.  If you feel it is possible to eliminate 1 of them please let me know; I defer to your judgement.  - Dr. Randolph

## 2021-06-23 NOTE — PROGRESS NOTES
Assessment/Plan:     Today's appointment was predominantly spent going over patient's medication.  I adjusted the medications to reflect the current dosing of the active medications she is taking.      Coronary artery disease: Currently on aspirin and Plavix, will reach out to cardiologist  Right subcortical infarction: See above  Paroxysmal atrial fibrillation: Well controlled on sotalol, current blood pressure today and pulse are within normal limits, adjusted medication to reflect cardiology changes at last appointment    I would consider eliminating either aspirin or Plavix if cardiology agrees, will send a message.  I will increase patient's fluoxetine today.  There is no improvement, would consider transition to something more appropriate in a geriatric population.  Patient does not seem as though she is at risk of harming herself.    Problem List Items Addressed This Visit        ENT/CARD/PULM/ENDO Problems    Rt Subcortical infarction    Coronary artery disease involving native coronary artery of native heart without angina pectoris - Primary       Other    Mild episode of recurrent major depressive disorder (H)    Relevant Medications    FLUoxetine (PROZAC) 20 MG capsule      Other Visit Diagnoses     Paroxysmal atrial fibrillation (H)              Return to clinic in 4 weeks for physical.     Total time spent with patient was 25 minutes with greater than 50% spent in face-to-face counseling regarding the above plan.    Subjective:      Jacklyn Thornton is a 87 y.o. female who presents to clinic for medication check.  Patient desired a refill of her clopidogrel recently.    Patient did see the cardiologist on 11/2/18. He recommended follow up in 1 year, decreased the sotolol but did not change aspirin + plavix.     Patient is also being treated for depression.  She was switched from sertraline to fluoxetine.  Duloxetine history working fairly well, her PHQ 9 today is only 6.  She has no thoughts of hurting  herself or others, she does make several comments, however, about may be not being here in the future.  When asked to clarify, she denies a plan or any intent to act on a plan to hurt herself.  She was amenable to an increase in medication.      Past Medical History, Family History, and Social History reviewed.     Current Outpatient Medications on File Prior to Visit   Medication Sig Dispense Refill     aspirin 81 mg chewable tablet Chew 81 mg daily.       cholecalciferol, vitamin D3, (VITAMIN D3) 2,000 unit cap Take 2,000 Units by mouth daily.       clopidogrel (PLAVIX) 75 mg tablet Take 1 tablet (75 mg total) by mouth daily. 30 tablet 5     HYDROcodone-acetaminophen 5-325 mg per tablet Take 1-2 tablets by mouth every 6 (six) hours as needed for pain. 30 tablet 0     isosorbide mononitrate (IMDUR) 30 MG 24 hr tablet Take 1 tablet (30 mg total) by mouth 2 (two) times a day. 180 tablet 3     levETIRAcetam (KEPPRA) 500 MG tablet Take 1 tablet (500 mg total) by mouth 2 (two) times a day. 180 tablet 0     nitroglycerin (NITROSTAT) 0.4 MG SL tablet Place 1 tablet (0.4 mg total) under the tongue every 5 (five) minutes as needed for chest pain. 20 tablet 0     omeprazole (PRILOSEC) 20 MG capsule Take 20 mg by mouth Daily before breakfast.       simvastatin (ZOCOR) 20 MG tablet TAKE 1 TABLET (20 MG TOTAL) BY MOUTH AT BEDTIME. 90 tablet 2     sotalol (BETAPACE) 80 MG tablet Take 1 tablet (80 mg total) by mouth daily. 30 tablet 0     spironolactone (ALDACTONE) 25 MG tablet TAKE ONE TABLET BY MOUTH ONE TIME DAILY 90 tablet 3     [DISCONTINUED] FLUoxetine (PROZAC) 10 MG capsule Take 1 capsule (10 mg total) by mouth daily. 90 capsule 3     [DISCONTINUED] meloxicam (MOBIC) 7.5 MG tablet TAKE 1 TABLET (7.5 MG TOTAL) BY MOUTH DAILY. 90 tablet 1     [DISCONTINUED] sertraline (ZOLOFT) 50 MG tablet Take 50 mg by mouth daily.       [DISCONTINUED] sotalol (BETAPACE) 120 MG tablet Take 120 mg by mouth daily.  3     [DISCONTINUED]  "traZODone (DESYREL) 50 MG tablet Take 1 tablet by mouth at bedtime as needed.       No current facility-administered medications on file prior to visit.        Review of systems is as stated in HPI.  Endorses fatigue, dizziness, memory loss.  The remainder of the 10 system review is otherwise negative.    Objective:     /84   Pulse 62   Ht 4' 11\" (1.499 m)   Wt 140 lb (63.5 kg)   SpO2 95%   BMI 28.28 kg/m   Body mass index is 28.28 kg/m .    Gen: Alert, NAD, appears stated age, normal hygiene   Psych: no apparent hallucinations or delusions, no pressured speech; alert, oriented x3; patient is positively denied suicidal ideation; he states sometimes she thought about it but states she would never act on it.  Her daughter believes it is just something to say out loud, patient denies active plan of hurting herself.      This note has been dictated using voice recognition software. Any grammatical or context distortions are unintentional and inherent to the the software.     Lela Randolph MD      "

## 2021-06-23 NOTE — TELEPHONE ENCOUNTER
Patient Returning Call  Reason for call:  Patient called back.  Information relayed to patient:  Informed this medication is still pending for the provider to sign.  Patient states she would like this done today.  Patient has additional questions:  Yes  If YES, what are your questions/concerns:  As above.  Okay to leave a detailed message?: No call back needed

## 2021-06-23 NOTE — TELEPHONE ENCOUNTER
Reason contacted: Medication question  Information relayed:  Below message   Additional questions:  No  Further follow-up needed:  No  Okay to leave a detailed message:  No

## 2021-06-24 NOTE — TELEPHONE ENCOUNTER
RN cannot approve Refill Request    RN can NOT refill this medication historical medication requested. Last office visit: 1/15/2019 Lela Randolph MD Last Physical: Visit date not found Last MTM visit: Visit date not found Last visit same specialty: 1/15/2019 Lela Randolph MD.  Next visit within 3 mo: Visit date not found  Next physical within 3 mo: Visit date not found      Carmen Knutson, [facility] Triage/Med Refill 2/20/2019    Requested Prescriptions   Pending Prescriptions Disp Refills     omeprazole (PRILOSEC) 20 MG capsule [Pharmacy Med Name: OMEPRAZOLE DR 20 MG CAPSULE] 90 capsule 1     Sig: TAKE ONE CAPSULE BY MOUTH ONE TIME DAILY    GI Medications Refill Protocol Passed - 2/18/2019  1:06 AM       Passed - PCP or prescribing provider visit in last 12 or next 3 months.    Last office visit with prescriber/PCP: 1/15/2019 Lela Randolph MD OR same dept: 1/15/2019 Lela Randolph MD OR same specialty: 1/15/2019 Lela Randolph MD  Last physical: Visit date not found Last MTM visit: Visit date not found   Next visit within 3 mo: Visit date not found  Next physical within 3 mo: Visit date not found  Prescriber OR PCP: Lela Randolph MD  Last diagnosis associated with med order: 1. GERD (gastroesophageal reflux disease)  - omeprazole (PRILOSEC) 20 MG capsule [Pharmacy Med Name: OMEPRAZOLE DR 20 MG CAPSULE]; TAKE ONE CAPSULE BY MOUTH ONE TIME DAILY  Dispense: 90 capsule; Refill: 1    If protocol passes may refill for 12 months if within 3 months of last provider visit (or a total of 15 months).

## 2021-06-24 NOTE — TELEPHONE ENCOUNTER
Refill Approved    Rx renewed per Medication Renewal Policy. Medication was last renewed on 12/6/18    Angel Giron, [facility] Triage/Med Refill 3/6/2019     Requested Prescriptions   Pending Prescriptions Disp Refills     levETIRAcetam (KEPPRA) 500 MG tablet [Pharmacy Med Name: LEVETIRACETAM 500 MG TABLET] 180 tablet 0     Sig: TAKE 1 TABLET BY MOUTH TWICE A DAY    Gabapentin/Levetiracetam/Tiagabine Refill Protocol  Passed - 3/4/2019  1:22 AM       Passed - PCP or prescribing provider visit in past 12 months or next 3 months    Last office visit with prescriber/PCP: 1/15/2019 Lela Randolph MD OR same dept: 1/15/2019 Lela Randolph MD OR same specialty: 1/15/2019 Lela Randolph MD  Last physical: Visit date not found Last MTM visit: Visit date not found   Next visit within 3 mo: Visit date not found  Next physical within 3 mo: Visit date not found  Prescriber OR PCP: Lela Randolph MD  Last diagnosis associated with med order: 1. Partial symptomatic epilepsy with complex partial seizures, not intractable, without status epilepticus (H)  - levETIRAcetam (KEPPRA) 500 MG tablet [Pharmacy Med Name: LEVETIRACETAM 500 MG TABLET]; TAKE 1 TABLET BY MOUTH TWICE A DAY  Dispense: 180 tablet; Refill: 0    If protocol passes may refill for 12 months if within 3 months of last provider visit (or a total of 15 months).

## 2021-06-25 NOTE — PROGRESS NOTES
Progress Notes by Connie Singh MD at 7/24/2017  3:10 PM     Author: Connie Singh MD Service: -- Author Type: Physician    Filed: 7/24/2017  3:32 PM Encounter Date: 7/24/2017 Status: Signed    : Connie Singh MD (Physician)                  [email or web address]           Thank you Dr. Light for asking the [facility] Care team to participate in the care of your patient, Jacklyn Thornton.     Impression and Plan     1. Coronary artery disease.  Jacklyn has known coronary artery disease as described below in History of Present Illness.     This is stable. Patient reports no concerning chest pain symptoms.     2. Atrial fibrillation. This has been subjectively and objectively quiescent on sotalol.  QTc on todays ECG is reasonable at 440 ms.     3. Hypertension. Blood pressure is quite reasonable at 114/70 mmHg.     4. Dyslipidemia.  Lipid profile within the last year 16 July 2015 was at/near target with LDL 85 mg/dL and HDL 36 mg/dL.     Plan on follow-up in one year.           History of Present Illness    Once again I would like to thank you again for asking me to participate in the care of your patient, Jacklyn Thornton.  As you know, but to reiterate for my own records, Jacklyn Thornton is a 86 y.o. female with known ischemic heart disease. Specifically, Jacklyn underwent angiography 31 August 2010 and was found to have chronic occlusion in the proximal segment of the 1st diagonal. She did have collateral filling from the left circulation. She also had disease involving the proximal segment of the obtuse marginal-2, and also significant disease, described as severe, in the proximal portion of the right coronary artery (see Cardiac Diagnostics section below).  Medical therapy was recommended at that time.     In addition, patient has a history of paroxysmal atrial fibrillation. This has been quiescent on sotalol.     In follow-up today, patient states that she is doing well from  a cardiovascular standpoint.  She specifically denies any chest pain, shortness of breath, or diminished exercise tolerance other than that related to some orthopedic issues.  She denies any subjective palpitations or lightheadedness.    Further review of systems is otherwise negative/noncontributory (medical record and 13 point review of systems reviewed as well and pertinent positives noted).         Cardiac Diagnostics      Echocardiogram 9 July 2010:  1. Normal left ventricular size.  Moderate to severe depression of left ventricular systolic performance with ejection fraction of 25-30%.  2. No significant valvular heart disease.  3. Moderate left atrial enlargement.  4. Mild concentric increase in left ventricular wall thickness.     Nuclear perfusion study 29 January 2013:  1. Small sized, moderate intensity area of ischemia involving the distal anterior wall with associated hypokinesis.  2. Normal left ventricular systolic performance with ejection fraction of 58%.     Coronary angiogram 31 August 2010:  1. Balance codominant coronary arterial system.  2. Chronic occlusion of the proximal segment of the first diagonal coronary artery with collaterals from the left circulation.  3. Second obtuse marginal coronary artery exhibited severe disease in the proximal segment.  4. Right coronary artery exhibited severe disease in the proximal segment.  5. Prior stent in the proximal left anterior descending coronary artery exhibits no restenosis.  6. Abnormal segmental wall motion with akinesis involving the inferobasilar segment.  Mild hypokinesis involving the anterior wall.  7. Medical therapy recommended.     Holter monitor 7 October 2014:  1. Mildly abnormal Holter monitor with increased atrial ectopy. This does not account for the patient's symptoms of presyncope listed as the indication for the test.  2. No symptoms were reported.  3. There were no sustained atrial or ventricular dysrhythmias observed.    "12-lead ECG (personally reviewed) 24 uly 2017: Sinus rhythm with heart rate of 54 bpm.  QTc 440 ms.    12-lead ECG (personally reviewed) 21 April 2016: Sinus rhythm with heart rate of 54 bpm.  QTc 413 ms.           Physical Examination       /70 (Patient Site: Right Arm, Patient Position: Sitting, Cuff Size: Adult Large)  Pulse 60  Resp 18  Ht 4' 11\" (1.499 m)  Wt 135 lb (61.2 kg)  BMI 27.27 kg/m2        Wt Readings from Last 3 Encounters:   07/24/17 135 lb (61.2 kg)   11/02/16 135 lb 8 oz (61.5 kg)   04/21/16 139 lb 8 oz (63.3 kg)     The patient is alert and oriented times three. Sclerae are anicteric. Mucosal membranes are moist. Jugular venous pressure is normal. No significant adenopathy/thyromegally appreciated. Lungs are clear with good expansion. On cardiovascular exam, the patient has a regular S1 and S2. Abdomen is soft and non-tender. Extremities reveal no clubbing, cyanosis, or edema.         Family History/Social History/Risk Factors   Patient does not smoke.  Family history of hypertension.         Medications  Allergies   Current Outpatient Prescriptions   Medication Sig Dispense Refill   ? amoxicillin (AMOXIL) 500 MG capsule TAKE 4 CAPSULES BY MOUTH 1 HOUR PRIOR TO DENTAL APPOINTMENT. 4 capsule 1   ? aspirin 81 mg chewable tablet Chew 81 mg daily.     ? cholecalciferol, vitamin D3, (VITAMIN D3) 2,000 unit cap Take 2,000 Units by mouth daily.     ? isosorbide mononitrate (IMDUR) 30 MG 24 hr tablet Take 1 tablet (30 mg total) by mouth 2 (two) times a day. 90 tablet 0   ? meloxicam (MOBIC) 7.5 MG tablet Take 7.5 mg by mouth at bedtime.     ? multivitamin therapeutic (THERAGRAN) tablet Take 1 tablet by mouth daily.     ? nitroglycerin (NITROSTAT) 0.4 MG SL tablet Place 0.4 mg under the tongue every 5 (five) minutes as needed for chest pain.     ? omeprazole (PRILOSEC) 20 MG capsule Take 20 mg by mouth Daily before breakfast.     ? sertraline (ZOLOFT) 50 MG tablet Take 50 mg by mouth daily.   "   ? simvastatin (ZOCOR) 20 MG tablet Take 1 tablet (20 mg total) by mouth at bedtime. 90 tablet 0   ? sotalol (BETAPACE) 120 MG tablet Take 120 mg by mouth daily.     ? spironolactone (ALDACTONE) 25 MG tablet Take 25 mg by mouth daily.     ? HYDROcodone-acetaminophen 5-325 mg per tablet Take 1-2 tablets by mouth every 6 (six) hours as needed for pain. 30 tablet 0     No current facility-administered medications for this visit.       No Known Allergies       Lab Results   Lab Results   Component Value Date     05/01/2017    K 4.1 05/01/2017     05/01/2017    CO2 27 05/01/2017    BUN 20 05/01/2017    CREATININE 0.74 05/01/2017    CALCIUM 9.5 05/01/2017     Lab Results   Component Value Date    WBC 7.7 05/01/2017    HGB 15.4 05/01/2017    HCT 46.9 05/01/2017    MCV 93 05/01/2017     05/01/2017     Lab Results   Component Value Date    CHOL 146 07/16/2015    TRIG 123 07/16/2015    HDL 36 (L) 07/16/2015    LDLCALC 85 07/16/2015    LDLDIRECT 98 10/02/2014     Lab Results   Component Value Date    INR 1.03 05/01/2017     Lab Results   Component Value Date     (H) 11/05/2010     Lab Results   Component Value Date    CKTOTAL 518 () 07/09/2010    CKMB 1 10/27/2010    CKMB 54 (HH) 07/09/2010    CKMB <1 07/04/2010    TROPONINI <0.01 10/27/2010    TROPONINI 25.84 () 07/09/2010    TROPONINI 30.44 () 07/09/2010     Lab Results   Component Value Date    TSH 1.1 07/06/2010

## 2021-06-26 NOTE — PROGRESS NOTES
Progress Notes by Connie Singh MD at 11/2/2018 11:30 AM     Author: Connie Singh MD Service: -- Author Type: Physician    Filed: 11/2/2018 11:30 AM Encounter Date: 11/2/2018 Status: Signed    : Connie Singh MD (Physician)                  [email or web address]  [phone]         Thank you Dr. Randolph for asking the [facility] Heart Care team to participate in the care of your patient, Jacklyn Thornton.     Impression and Plan     1. Coronary artery disease.  Jacklyn has known coronary artery disease as described below in History of Present Illness.    Patient had been briefly hospitalized mid September 2018.  Patient had reported some chest discomfort.  At that time an echocardiogram was performed which revealed no wall motion abnormality and well-preserved LV systolic performance.  Patient was seen by my colleague, Dr. Estefanía Arevalo.  Further workup such as with nuclear perfusion imaging was discussed and it was ultimately jointly decided with the patient to defer additional workup at this time and continue with medical therapy.     This is stable. Patient reports no concerning chest pain symptoms.     2. Atrial fibrillation. This has been subjectively and objectively quiescent on sotalol.  QTc on todays ECG is reasonable at 440 ms.  Due to some tendency toward lower heart rates, admitted September 2018 sotalol was decreased to 80 mg once daily.  Patient is on both aspirin and clopidogrel.     3. Hypertension. Blood pressure is quite reasonable at present.     4. Dyslipidemia.    Direct LDL 17 March 2018 was fairly close to target at 79 mg/dL.     Plan on follow-up in one year.         History of Present Illness    Once again I would like to thank you again for asking me to participate in the care of your patient, Jacklyn Thornton.  As you know, but to reiterate for my own records, Jacklyn Thornton is a 87 y.o. female with known ischemic heart disease. Specifically,  Jacklyn underwent angiography 31 August 2010 and was found to have chronic occlusion in the proximal segment of the 1st diagonal. She did have collateral filling from the left circulation. She also had disease involving the proximal segment of the obtuse marginal-2, and also significant disease, described as severe, in the proximal portion of the right coronary artery (see Cardiac Diagnostics section below).  Medical therapy was recommended at that time.    Patient had been briefly hospitalized mid September 2018.  Patient had reported some chest discomfort.  At that time an echocardiogram was performed which revealed no wall motion abnormality and well-preserved LV systolic performance.  Patient was seen by my colleague, Dr. Estefanía Arevalo.  Further workup such as with nuclear perfusion imaging was discussed and it was ultimately jointly decided with the patient to defer additional workup at this time and continue with medical therapy.     In addition, patient has a history of paroxysmal atrial fibrillation. This has been quiescent on sotalol.     In follow-up today, patient states that she is doing well from a cardiovascular standpoint.  She specifically denies any chest pain, shortness of breath, or diminished exercise tolerance other than that related to some orthopedic issues.  She denies any subjective palpitations or lightheadedness.    Further review of systems is otherwise negative/noncontributory (medical record and 13 point review of systems reviewed as well and pertinent positives noted).         Cardiac Diagnostics      Echocardiogram 14 September 2018:  1. Normal left ventricular size and systolic performance with ejection fraction of 65-70%.  2. No significant valvular heart disease.  3. Normal right ventricular size and systolic performance.  4. Normal atrial dimensions.    Echocardiogram 17 March 2018:  1. Normal left ventricular size and systolic performance with ejection fraction of  "55-60%  2. No significant valvular heart disease.  3. Normal right ventricular size and systolic performance.  4. Moderate increase in left atrial volume index.     Nuclear perfusion study 29 January 2013:  1. Small sized, moderate intensity area of ischemia involving the distal anterior wall with associated hypokinesis.  2. Normal left ventricular systolic performance with ejection fraction of 58%.    Coronary angiogram 31 August 2010:  1. Balance codominant coronary arterial system.  2. Chronic occlusion of the proximal segment of the first diagonal coronary artery with collaterals from the left circulation.  3. Second obtuse marginal coronary artery exhibited severe disease in the proximal segment.  4. Right coronary artery exhibited severe disease in the proximal segment.  5. Prior stent in the proximal left anterior descending coronary artery exhibits no restenosis.  6. Abnormal segmental wall motion with akinesis involving the inferobasilar segment.  Mild hypokinesis involving the anterior wall.  o Medical therapy recommended.    Holter monitor 7 October 2014:  1. Mildly abnormal Holter monitor with increased atrial ectopy. This does not account for the patient's symptoms of presyncope listed as the indication for the test.  2. No symptoms were reported.  3. There were no sustained atrial or ventricular dysrhythmias observed.    12-lead ECG (personally reviewed) 24 uly 2017: Sinus rhythm with heart rate of 54 bpm.  QTc 440 ms.    12-lead ECG (personally reviewed) 21 April 2016: Sinus rhythm with heart rate of 54 bpm.  QTc 413 ms.            Physical Examination       /80 (Patient Site: Right Arm, Patient Position: Sitting, Cuff Size: Adult Regular)  Pulse (!) 56  Resp 16  Ht 4' 11\" (1.499 m)  Wt 142 lb (64.4 kg)  BMI 28.68 kg/m2        Wt Readings from Last 3 Encounters:   11/02/18 142 lb (64.4 kg)   09/15/18 139 lb 12.8 oz (63.4 kg)   03/19/18 140 lb 9.6 oz (63.8 kg)     The patient is alert and " oriented times three. Sclerae are anicteric. Mucosal membranes are moist. Jugular venous pressure is normal. No significant adenopathy/thyromegally appreciated. Lungs are clear with good expansion. On cardiovascular exam, the patient has a regular S1 and S2. Abdomen is soft and non-tender. Extremities reveal no clubbing, cyanosis, or edema.       Family History/Social History/Risk Factors   Patient does not smoke.  Family history of hypertension.         Medications  Allergies   Current Outpatient Prescriptions   Medication Sig Dispense Refill   ? aspirin 81 mg chewable tablet Chew 81 mg daily.     ? cholecalciferol, vitamin D3, (VITAMIN D3) 2,000 unit cap Take 2,000 Units by mouth daily.     ? HYDROcodone-acetaminophen 5-325 mg per tablet Take 1-2 tablets by mouth every 6 (six) hours as needed for pain. 90 tablet 0   ? isosorbide mononitrate (IMDUR) 30 MG 24 hr tablet Take 1 tablet (30 mg total) by mouth 2 (two) times a day. 60 tablet 11   ? meloxicam (MOBIC) 7.5 MG tablet TAKE 1 TABLET (7.5 MG TOTAL) BY MOUTH DAILY. 90 tablet 1   ? nitroglycerin (NITROSTAT) 0.4 MG SL tablet Place 1 tablet (0.4 mg total) under the tongue every 5 (five) minutes as needed for chest pain. 20 tablet 0   ? omeprazole (PRILOSEC) 20 MG capsule Take 20 mg by mouth Daily before breakfast.     ? sertraline (ZOLOFT) 50 MG tablet Take 50 mg by mouth daily.     ? simvastatin (ZOCOR) 20 MG tablet TAKE 1 TABLET (20 MG TOTAL) BY MOUTH AT BEDTIME. 90 tablet 2   ? sotalol (BETAPACE) 120 MG tablet Take 120 mg by mouth daily.  3   ? spironolactone (ALDACTONE) 25 MG tablet TAKE ONE TABLET BY MOUTH ONE TIME DAILY 90 tablet 3   ? traZODone (DESYREL) 50 MG tablet Take 1 tablet by mouth at bedtime as needed.     ? clopidogrel (PLAVIX) 75 mg tablet Take 1 tablet (75 mg total) by mouth daily. 30 tablet 5   ? FLUoxetine (PROZAC) 10 MG capsule Take 1 capsule (10 mg total) by mouth daily. 90 capsule 3   ? levETIRAcetam (KEPPRA) 500 MG tablet TAKE 1 TABLET BY  MOUTH TWICE A DAY 60 tablet 6   ? sotalol (BETAPACE) 80 MG tablet Take 1 tablet (80 mg total) by mouth daily. 30 tablet 0     No current facility-administered medications for this visit.       No Known Allergies       Lab Results   Lab Results   Component Value Date     09/14/2018    K 4.0 09/14/2018     09/14/2018    CO2 29 09/14/2018    BUN 20 09/14/2018    CREATININE 0.68 09/14/2018    CALCIUM 9.3 09/14/2018     Lab Results   Component Value Date    WBC 5.7 09/14/2018    HGB 15.6 09/14/2018    HCT 47.8 (H) 09/14/2018    MCV 95 09/14/2018     09/14/2018     Lab Results   Component Value Date    CHOL 132 03/17/2018    TRIG 106 03/17/2018    HDL 32 (L) 03/17/2018    LDLCALC 79 03/17/2018    LDLDIRECT 98 10/02/2014     Lab Results   Component Value Date    INR 1.00 03/16/2018     Lab Results   Component Value Date     (H) 11/05/2010     Lab Results   Component Value Date    CKTOTAL 518 (HH) 07/09/2010    CKMB 1 10/27/2010    CKMB 54 (HH) 07/09/2010    CKMB <1 07/04/2010    TROPONINI 0.02 09/14/2018    TROPONINI <0.01 09/14/2018    TROPONINI <0.01 09/14/2018     Lab Results   Component Value Date    TSH 1.1 07/06/2010

## 2021-06-26 NOTE — PROGRESS NOTES
Progress Notes by Yesenia Hardy CMA at 3/14/2018  2:00 PM     Author: Yesenia Hardy CMA Service: -- Author Type: Certified Medical Assistant    Filed: 3/16/2018  7:29 AM Encounter Date: 3/14/2018 Status: Attested    : Yesenia Hardy CMA (Certified Medical Assistant) Cosigner: Lela Randolph MD at 3/16/2018  8:17 AM    Attestation signed by Lela Randolph MD at 3/16/2018  8:17 AM    Lela Randolph MD  [facility]                  I met with Jacklyn Thornton at the request of Dr. Randolph to recheck her blood pressure.  Blood pressure medications on the MAR were reviewed with patient.    Patient has taken all medications as per usual regimen: Yes  Patient reports tolerating them without any issues or concerns: Yes    Vitals:    03/14/18 1407   BP: 138/90       Blood pressure was taken, previous encounter was reviewed, recorded blood pressure below 140/90.  Patient was discharged and the note will be sent to the provider for final review.

## 2021-06-28 NOTE — PROGRESS NOTES
Progress Notes by Connie Singh MD at 11/22/2019 12:50 PM     Author: Connie Singh MD Service: -- Author Type: Physician    Filed: 11/22/2019  2:02 PM Encounter Date: 11/22/2019 Status: Signed    : Connie Singh MD (Physician)                                       Thank you Dr. Randolph for asking the [facility] team to participate in the care of your patient, Jacklyn Thornton.     Impression and Plan     1. Coronary artery disease.  Jacklyn has known coronary artery disease as described below in History of Present Illness.     Patient had been briefly hospitalized mid September 2018.  Patient had reported some chest discomfort.  At that time an echocardiogram was performed which revealed no wall motion abnormality and well-preserved LV systolic performance.  Patient was seen by my colleague, Dr. Estefanía Arevalo.  Further workup such as with nuclear perfusion imaging was discussed and it was ultimately jointly decided with the patient to defer additional workup at this time and continue with medical therapy due to history of subcortical infarction.     This is stable. Patient reports no concerning chest pain symptoms.     2. Atrial fibrillation.  Historically this has been quiescent on sotalol.    Rhythm on exam is somewhat irregular today.  Twelve-lead ECG confirms redevelopment of atrial fibrillation with well-controlled ventricular response.  She did have a ECG last month as well which also had revealed redevelopment of atrial fibrillation.  Patient is completely asymptomatic with the rhythm disturbance.  It is possible she may be going in and out of atrial fibrillation and given well-controlled ventricular response and reasonable QTc for now we will continue with sotalol therapy.  She is, however, at risk for thromboembolism.  I discussed anticoagulation options with Jacklyn today as well as her 2 daughters, Vanessa and Paula.  I am advocating full anticoagulation  initially.  She does have some instability issues and requires a walker. I also discussed with them the possibility of left atrial appendage occlusion device.  I provided Jacklyn some literature in this regard and described the nature of the procedure and the like.  Plan:    Initiate apixaban 5 mg twice daily.    Discontinue clopidogrel.    Patient will think about potential left atrial appendage occlusion of the device as an option for CVA prophylaxis.  At first blush, she and her family expressed some interest in perhaps pursuing this though wanted to think about it some more.  I asked that she call if she would like to proceed.    3. Hypertension. Blood pressure is quite reasonable at present.     4. Dyslipidemia.    Direct LDL 17 March 2018 was fairly close to target at 79 mg/dL.     Plan on follow-up in 3 months.         History of Present Illness    Once again I would like to thank you again for asking me to participate in the care of your patient, Jacklyn Thornton.  As you know, but to reiterate for my own records, Jacklyn Thornton is a 88 y.o. female with known ischemic heart disease. Specifically, Jacklyn underwent angiography 31 August 2010 and was found to have chronic occlusion in the proximal segment of the 1st diagonal. She did have collateral filling from the left circulation. She also had disease involving the proximal segment of the obtuse marginal-2, and also significant disease, described as severe, in the proximal portion of the right coronary artery (see Cardiac Diagnostics section below).  Medical therapy was recommended at that time.     Patient had been briefly hospitalized mid September 2018.  Patient had reported some chest discomfort.  At that time an echocardiogram was performed that revealed no wall motion abnormality and well-preserved LV systolic performance.  Patient was seen by my colleague, Dr. Estefanía Arevalo.  Further workup such as with nuclear perfusion imaging was discussed and  it was ultimately jointly decided with the patient to defer additional workup at the time and continue with medical therapy.     In addition, patient has a history of paroxysmal atrial fibrillation.      In follow-up today, patient states that she is doing well from a cardiovascular standpoint.  She specifically denies any chest pain, shortness of breath, or diminished exercise tolerance other than that related to some orthopedic issues.  She denies any subjective palpitations or lightheadedness.    Further review of systems is otherwise negative/noncontributory (medical record and 13 point review of systems reviewed as well and pertinent positives noted).         Cardiac Diagnostics      Echocardiogram 14 September 2018:  1. Normal left ventricular size and systolic performance with ejection fraction of 65-70%.  2. No significant valvular heart disease.  3. Normal right ventricular size and systolic performance.  4. Normal atrial dimensions.    Echocardiogram 17 March 2018:  1. Normal left ventricular size and systolic performance with ejection fraction of 55-60%  2. No significant valvular heart disease.  3. Normal right ventricular size and systolic performance.  4. Moderate increase in left atrial volume index.    Nuclear perfusion study 29 January 2013:  1. Small sized, moderate intensity area of ischemia involving the distal anterior wall with associated hypokinesis.  2. Normal left ventricular systolic performance with ejection fraction of 58%.    Coronary angiogram 31 August 2010:  1. Balance codominant coronary arterial system.  2. Chronic occlusion of the proximal segment of the first diagonal coronary artery with collaterals from the left circulation.  3. Second obtuse marginal coronary artery exhibited severe disease in the proximal segment.  4. Right coronary artery exhibited severe disease in the proximal segment.  5. Prior stent in the proximal left anterior descending coronary artery exhibits no  "restenosis.  6. Abnormal segmental wall motion with akinesis involving the inferobasilar segment.  Mild hypokinesis involving the anterior wall.    Medical therapy recommended.    Holter monitor 7 October 2014:  1. Mildly abnormal Holter monitor with increased atrial ectopy. This does not account for the patient's symptoms of presyncope listed as the indication for the test.  2. No symptoms were reported.  3. There were no sustained atrial or ventricular dysrhythmias observed.     12-lead ECG (personally reviewed) 22 November 2019: Atrial fibrillation with heart rate of 87 bpm.  QTc 474 ms.    12-lead ECG (personally reviewed) 24 uly 2017: Sinus rhythm with heart rate of 54 bpm.  QTc 440 ms.     12-lead ECG (personally reviewed) 21 April 2016: Sinus rhythm with heart rate of 54 bpm.  QTc 413 ms.            Physical Examination       /80 (Patient Site: Left Arm, Patient Position: Sitting, Cuff Size: Adult Regular)   Pulse 76   Resp 14   Ht 4' 11\" (1.499 m)   Wt 134 lb (60.8 kg)   BMI 27.06 kg/m          Wt Readings from Last 3 Encounters:   11/22/19 134 lb (60.8 kg)   10/24/19 135 lb (61.2 kg)   10/15/19 148 lb (67.1 kg)     The patient is alert and oriented times three. Sclerae are anicteric. Mucosal membranes are moist. Jugular venous pressure is normal. No significant adenopathy/thyromegally appreciated. Lungs are clear with good expansion. On cardiovascular exam, the patient has an irregular S1 and S2. Abdomen is soft and non-tender. Extremities reveal no clubbing, cyanosis, or edema.         Family History/Social History/Risk Factors   Patient does not smoke.  Family history reviewed, and family history includes Hypertension in her father.          Medications  Allergies   Current Outpatient Medications   Medication Sig Dispense Refill   ? cholecalciferol, vitamin D3, 1,000 unit tablet Take 1,000 Units by mouth daily.     ? FLUoxetine (PROZAC) 40 MG capsule TAKE 1 CAPSULE BY MOUTH EVERY DAY 90 capsule " 1   ? isosorbide mononitrate (IMDUR) 30 MG 24 hr tablet Take 1 tablet (30 mg total) by mouth 2 (two) times a day. 180 tablet 3   ? levETIRAcetam (KEPPRA) 500 MG tablet TAKE 1 TABLET BY MOUTH TWICE A  tablet 3   ? meloxicam (MOBIC) 7.5 MG tablet Take 1 tablet (7.5 mg total) by mouth daily. 60 tablet 1   ? nitroglycerin (NITROSTAT) 0.4 MG SL tablet Place 1 tablet (0.4 mg total) under the tongue every 5 (five) minutes as needed for chest pain. 20 tablet 0   ? omeprazole (PRILOSEC) 20 MG capsule TAKE 1 CAPSULE BY MOUTH EVERY DAY 90 capsule 1   ? simvastatin (ZOCOR) 20 MG tablet TAKE 1 TABLET BY MOUTH EVERYDAY AT BEDTIME 90 tablet 2   ? sotalol (BETAPACE) 80 MG tablet Take 40 mg by mouth 2 (two) times a day.     ? spironolactone (ALDACTONE) 25 MG tablet Take 1 tablet (25 mg total) by mouth daily. 90 tablet 1   ? apixaban (ELIQUIS) 5 mg Tab tablet Take by mouth 2 (two) times a day. 180 tablet 3     No current facility-administered medications for this visit.       No Known Allergies       Lab Results   Lab Results   Component Value Date     10/15/2019    K 5.2 (H) 10/15/2019     10/15/2019    CO2 22 10/15/2019    BUN 29 (H) 10/15/2019    CREATININE 0.93 10/15/2019    CALCIUM 9.9 10/15/2019     Lab Results   Component Value Date    WBC 10.8 10/15/2019    HGB 16.2 (H) 10/15/2019    HCT 49.8 (H) 10/15/2019    MCV 95 10/15/2019     10/15/2019     Lab Results   Component Value Date    CHOL 132 03/17/2018    TRIG 106 03/17/2018    HDL 32 (L) 03/17/2018    LDLCALC 79 03/17/2018    LDLDIRECT 98 10/02/2014     Lab Results   Component Value Date    INR 1.03 10/15/2019     Lab Results   Component Value Date     (H) 11/05/2010     Lab Results   Component Value Date    CKTOTAL 518 (HH) 07/09/2010    CKMB 1 10/27/2010    CKMB 54 (HH) 07/09/2010    CKMB <1 07/04/2010    TROPONINI 0.02 09/14/2018    TROPONINI <0.01 09/14/2018    TROPONINI <0.01 09/14/2018     Lab Results   Component Value Date    TSH 1.1  07/06/2010

## 2021-07-03 NOTE — ADDENDUM NOTE
Addendum Note by Joanne Mccain, PharmCELI at 5/2/2019 11:00 AM     Author: Joanne Mccain PharmD Service: -- Author Type: Pharmacist    Filed: 5/6/2019 12:35 PM Encounter Date: 5/2/2019 Status: Signed    : Joanne Mccain PharmD (Pharmacist)    Addended by: JOANNE MCCAIN on: 5/6/2019 12:35 PM        Modules accepted: Orders

## 2021-07-03 NOTE — ADDENDUM NOTE
Addendum Note by Lela Arreola MD at 3/19/2018  6:59 AM     Author: Lela Arreola MD Service: -- Author Type: Physician    Filed: 3/19/2018  6:59 AM Encounter Date: 3/16/2018 Status: Signed    : Lela Arreola MD (Physician)    Addended by: LELA ARREOLA on: 3/19/2018 06:59 AM        Modules accepted: Orders

## 2021-07-03 NOTE — ADDENDUM NOTE
Addendum Note by Joanne Mccain PharmCELI at 5/2/2019 11:00 AM     Author: Joanne Mccain PharmD Service: -- Author Type: Pharmacist    Filed: 5/3/2019  9:35 AM Encounter Date: 5/2/2019 Status: Signed    : Joanne Mccain PharmD (Pharmacist)    Addended by: JOANNE MCCAIN on: 5/3/2019 09:35 AM        Modules accepted: Orders

## 2021-08-03 PROBLEM — G40.209 COMPLEX PARTIAL SEIZURES (H): Status: RESOLVED | Noted: 2018-03-18 | Resolved: 2018-03-18

## 2021-08-15 ENCOUNTER — HEALTH MAINTENANCE LETTER (OUTPATIENT)
Age: 86
End: 2021-08-15

## 2021-10-10 ENCOUNTER — HEALTH MAINTENANCE LETTER (OUTPATIENT)
Age: 86
End: 2021-10-10

## 2022-09-18 ENCOUNTER — HEALTH MAINTENANCE LETTER (OUTPATIENT)
Age: 87
End: 2022-09-18

## 2023-10-08 ENCOUNTER — HEALTH MAINTENANCE LETTER (OUTPATIENT)
Age: 88
End: 2023-10-08